# Patient Record
Sex: FEMALE | Race: WHITE | Employment: FULL TIME | URBAN - NONMETROPOLITAN AREA
[De-identification: names, ages, dates, MRNs, and addresses within clinical notes are randomized per-mention and may not be internally consistent; named-entity substitution may affect disease eponyms.]

---

## 2021-02-08 ENCOUNTER — HOSPITAL ENCOUNTER (INPATIENT)
Age: 36
LOS: 3 days | Discharge: HOME | End: 2021-02-11
Attending: OBSTETRICS & GYNECOLOGY | Admitting: OBSTETRICS & GYNECOLOGY
Payer: COMMERCIAL

## 2021-02-08 ENCOUNTER — ANESTHESIA (OUTPATIENT)
Dept: LABOR AND DELIVERY | Age: 36
End: 2021-02-08
Payer: COMMERCIAL

## 2021-02-08 ENCOUNTER — ANESTHESIA EVENT (OUTPATIENT)
Dept: LABOR AND DELIVERY | Age: 36
End: 2021-02-08
Payer: COMMERCIAL

## 2021-02-08 LAB
ABO: NORMAL
AMNISURE PATIENT RESULT: NORMAL
AMPHETAMINE+METHAMPHETAMINE URINE SCREEN: NEGATIVE
ANTIBODY IDENTIFICATION: NORMAL
ANTIBODY SCREEN: NORMAL
BARBITURATE QUANTITATIVE URINE: NEGATIVE
BASOPHILS # BLD: 0.3 %
BASOPHILS ABSOLUTE: 0 THOU/MM3 (ref 0–0.1)
BENZODIAZEPINE QUANTITATIVE URINE: NEGATIVE
CANNABINOID QUANTITATIVE URINE: NEGATIVE
COCAINE METABOLITE QUANTITATIVE URINE: NEGATIVE
EOSINOPHIL # BLD: 0.3 %
EOSINOPHILS ABSOLUTE: 0 THOU/MM3 (ref 0–0.4)
ERYTHROCYTE [DISTWIDTH] IN BLOOD BY AUTOMATED COUNT: 13.4 % (ref 11.5–14.5)
ERYTHROCYTE [DISTWIDTH] IN BLOOD BY AUTOMATED COUNT: 43 FL (ref 35–45)
HCT VFR BLD CALC: 39.1 % (ref 37–47)
HEMOGLOBIN: 13.1 GM/DL (ref 12–16)
IMMATURE GRANS (ABS): 0.08 THOU/MM3 (ref 0–0.07)
IMMATURE GRANULOCYTES: 0.5 %
INDIRECT COOMBS: NORMAL
LYMPHOCYTES # BLD: 19.9 %
LYMPHOCYTES ABSOLUTE: 3 THOU/MM3 (ref 1–4.8)
Lab: NORMAL
MCH RBC QN AUTO: 29.4 PG (ref 26–33)
MCHC RBC AUTO-ENTMCNC: 33.5 GM/DL (ref 32.2–35.5)
MCV RBC AUTO: 87.7 FL (ref 81–99)
MONOCYTES # BLD: 5.3 %
MONOCYTES ABSOLUTE: 0.8 THOU/MM3 (ref 0.4–1.3)
NUCLEATED RED BLOOD CELLS: 0 /100 WBC
OPIATES, URINE: NEGATIVE
OXYCODONE: NEGATIVE
PHENCYCLIDINE QUANTITATIVE URINE: NEGATIVE
PLATELET # BLD: 303 THOU/MM3 (ref 130–400)
PMV BLD AUTO: 10.8 FL (ref 9.4–12.4)
RBC # BLD: 4.46 MILL/MM3 (ref 4.2–5.4)
RH FACTOR: NORMAL
RPR: NONREACTIVE
SEG NEUTROPHILS: 73.7 %
SEGMENTED NEUTROPHILS ABSOLUTE COUNT: 11.1 THOU/MM3 (ref 1.8–7.7)
WBC # BLD: 15.1 THOU/MM3 (ref 4.8–10.8)

## 2021-02-08 PROCEDURE — 80307 DRUG TEST PRSMV CHEM ANLYZR: CPT

## 2021-02-08 PROCEDURE — 86870 RBC ANTIBODY IDENTIFICATION: CPT

## 2021-02-08 PROCEDURE — 36415 COLL VENOUS BLD VENIPUNCTURE: CPT

## 2021-02-08 PROCEDURE — 6360000002 HC RX W HCPCS: Performed by: ANESTHESIOLOGY

## 2021-02-08 PROCEDURE — 86592 SYPHILIS TEST NON-TREP QUAL: CPT

## 2021-02-08 PROCEDURE — 86901 BLOOD TYPING SEROLOGIC RH(D): CPT

## 2021-02-08 PROCEDURE — 3700000025 EPIDURAL BLOCK: Performed by: ANESTHESIOLOGY

## 2021-02-08 PROCEDURE — 2580000003 HC RX 258: Performed by: OBSTETRICS & GYNECOLOGY

## 2021-02-08 PROCEDURE — 86905 BLOOD TYPING RBC ANTIGENS: CPT

## 2021-02-08 PROCEDURE — 84112 EVAL AMNIOTIC FLUID PROTEIN: CPT

## 2021-02-08 PROCEDURE — 85025 COMPLETE CBC W/AUTO DIFF WBC: CPT

## 2021-02-08 PROCEDURE — 86885 COOMBS TEST INDIRECT QUAL: CPT

## 2021-02-08 PROCEDURE — 6360000002 HC RX W HCPCS

## 2021-02-08 PROCEDURE — 2500000003 HC RX 250 WO HCPCS: Performed by: ANESTHESIOLOGY

## 2021-02-08 PROCEDURE — 6360000002 HC RX W HCPCS: Performed by: OBSTETRICS & GYNECOLOGY

## 2021-02-08 PROCEDURE — 86900 BLOOD TYPING SEROLOGIC ABO: CPT

## 2021-02-08 PROCEDURE — 86850 RBC ANTIBODY SCREEN: CPT

## 2021-02-08 PROCEDURE — 1220000001 HC SEMI PRIVATE L&D R&B

## 2021-02-08 RX ORDER — ROPIVACAINE HYDROCHLORIDE 2 MG/ML
INJECTION, SOLUTION EPIDURAL; INFILTRATION; PERINEURAL PRN
Status: DISCONTINUED | OUTPATIENT
Start: 2021-02-08 | End: 2021-02-09 | Stop reason: SDUPTHER

## 2021-02-08 RX ORDER — ACETAMINOPHEN 325 MG/1
650 TABLET ORAL EVERY 4 HOURS PRN
Status: DISCONTINUED | OUTPATIENT
Start: 2021-02-08 | End: 2021-02-09 | Stop reason: HOSPADM

## 2021-02-08 RX ORDER — NALOXONE HYDROCHLORIDE 0.4 MG/ML
0.4 INJECTION, SOLUTION INTRAMUSCULAR; INTRAVENOUS; SUBCUTANEOUS PRN
Status: DISCONTINUED | OUTPATIENT
Start: 2021-02-08 | End: 2021-02-09 | Stop reason: HOSPADM

## 2021-02-08 RX ORDER — FENTANYL CITRATE 50 UG/ML
INJECTION, SOLUTION INTRAMUSCULAR; INTRAVENOUS PRN
Status: DISCONTINUED | OUTPATIENT
Start: 2021-02-08 | End: 2021-02-09 | Stop reason: SDUPTHER

## 2021-02-08 RX ORDER — SODIUM CHLORIDE 0.9 % (FLUSH) 0.9 %
10 SYRINGE (ML) INJECTION PRN
Status: DISCONTINUED | OUTPATIENT
Start: 2021-02-08 | End: 2021-02-09 | Stop reason: HOSPADM

## 2021-02-08 RX ORDER — SODIUM CHLORIDE, SODIUM LACTATE, POTASSIUM CHLORIDE, CALCIUM CHLORIDE 600; 310; 30; 20 MG/100ML; MG/100ML; MG/100ML; MG/100ML
INJECTION, SOLUTION INTRAVENOUS CONTINUOUS
Status: DISCONTINUED | OUTPATIENT
Start: 2021-02-08 | End: 2021-02-09

## 2021-02-08 RX ORDER — METHYLERGONOVINE MALEATE 0.2 MG/ML
200 INJECTION INTRAVENOUS PRN
Status: DISCONTINUED | OUTPATIENT
Start: 2021-02-08 | End: 2021-02-09 | Stop reason: HOSPADM

## 2021-02-08 RX ORDER — MISOPROSTOL 200 UG/1
1000 TABLET ORAL PRN
Status: DISCONTINUED | OUTPATIENT
Start: 2021-02-08 | End: 2021-02-09 | Stop reason: HOSPADM

## 2021-02-08 RX ORDER — SEVOFLURANE 250 ML/250ML
1 LIQUID RESPIRATORY (INHALATION) CONTINUOUS PRN
Status: DISCONTINUED | OUTPATIENT
Start: 2021-02-08 | End: 2021-02-09 | Stop reason: HOSPADM

## 2021-02-08 RX ORDER — TERBUTALINE SULFATE 1 MG/ML
0.25 INJECTION, SOLUTION SUBCUTANEOUS
Status: ACTIVE | OUTPATIENT
Start: 2021-02-08 | End: 2021-02-08

## 2021-02-08 RX ORDER — MORPHINE SULFATE 4 MG/ML
4 INJECTION, SOLUTION INTRAMUSCULAR; INTRAVENOUS
Status: DISCONTINUED | OUTPATIENT
Start: 2021-02-08 | End: 2021-02-09 | Stop reason: HOSPADM

## 2021-02-08 RX ORDER — ONDANSETRON 2 MG/ML
4 INJECTION INTRAMUSCULAR; INTRAVENOUS EVERY 6 HOURS PRN
Status: DISCONTINUED | OUTPATIENT
Start: 2021-02-08 | End: 2021-02-09 | Stop reason: HOSPADM

## 2021-02-08 RX ORDER — NALBUPHINE HCL 10 MG/ML
5 AMPUL (ML) INJECTION EVERY 4 HOURS PRN
Status: DISCONTINUED | OUTPATIENT
Start: 2021-02-08 | End: 2021-02-09 | Stop reason: HOSPADM

## 2021-02-08 RX ORDER — BUTORPHANOL TARTRATE 1 MG/ML
1 INJECTION, SOLUTION INTRAMUSCULAR; INTRAVENOUS
Status: DISCONTINUED | OUTPATIENT
Start: 2021-02-08 | End: 2021-02-09 | Stop reason: HOSPADM

## 2021-02-08 RX ORDER — DOCUSATE SODIUM 100 MG/1
100 CAPSULE, LIQUID FILLED ORAL DAILY
COMMUNITY
End: 2021-07-30

## 2021-02-08 RX ORDER — CARBOPROST TROMETHAMINE 250 UG/ML
250 INJECTION, SOLUTION INTRAMUSCULAR PRN
Status: CANCELLED | OUTPATIENT
Start: 2021-02-08

## 2021-02-08 RX ORDER — SODIUM CHLORIDE 0.9 % (FLUSH) 0.9 %
10 SYRINGE (ML) INJECTION EVERY 12 HOURS SCHEDULED
Status: DISCONTINUED | OUTPATIENT
Start: 2021-02-08 | End: 2021-02-09 | Stop reason: HOSPADM

## 2021-02-08 RX ORDER — ONDANSETRON 2 MG/ML
8 INJECTION INTRAMUSCULAR; INTRAVENOUS EVERY 6 HOURS PRN
Status: DISCONTINUED | OUTPATIENT
Start: 2021-02-08 | End: 2021-02-09 | Stop reason: HOSPADM

## 2021-02-08 RX ORDER — MORPHINE SULFATE 2 MG/ML
2 INJECTION, SOLUTION INTRAMUSCULAR; INTRAVENOUS
Status: DISCONTINUED | OUTPATIENT
Start: 2021-02-08 | End: 2021-02-09 | Stop reason: HOSPADM

## 2021-02-08 RX ORDER — IBUPROFEN 800 MG/1
800 TABLET ORAL EVERY 8 HOURS PRN
Status: DISCONTINUED | OUTPATIENT
Start: 2021-02-08 | End: 2021-02-09 | Stop reason: HOSPADM

## 2021-02-08 RX ORDER — DIPHENHYDRAMINE HYDROCHLORIDE 50 MG/ML
25 INJECTION INTRAMUSCULAR; INTRAVENOUS EVERY 4 HOURS PRN
Status: DISCONTINUED | OUTPATIENT
Start: 2021-02-08 | End: 2021-02-09 | Stop reason: HOSPADM

## 2021-02-08 RX ORDER — LIDOCAINE HYDROCHLORIDE 10 MG/ML
30 INJECTION, SOLUTION EPIDURAL; INFILTRATION; INTRACAUDAL; PERINEURAL PRN
Status: DISCONTINUED | OUTPATIENT
Start: 2021-02-08 | End: 2021-02-09 | Stop reason: HOSPADM

## 2021-02-08 RX ORDER — ROPIVACAINE HYDROCHLORIDE 2 MG/ML
INJECTION, SOLUTION EPIDURAL; INFILTRATION; PERINEURAL
Status: COMPLETED
Start: 2021-02-08 | End: 2021-02-08

## 2021-02-08 RX ADMIN — ONDANSETRON 8 MG: 2 INJECTION INTRAMUSCULAR; INTRAVENOUS at 14:59

## 2021-02-08 RX ADMIN — SODIUM CHLORIDE, POTASSIUM CHLORIDE, SODIUM LACTATE AND CALCIUM CHLORIDE: 600; 310; 30; 20 INJECTION, SOLUTION INTRAVENOUS at 01:57

## 2021-02-08 RX ADMIN — ONDANSETRON 4 MG: 2 INJECTION INTRAMUSCULAR; INTRAVENOUS at 18:18

## 2021-02-08 RX ADMIN — ONDANSETRON 8 MG: 2 INJECTION INTRAMUSCULAR; INTRAVENOUS at 21:57

## 2021-02-08 RX ADMIN — BUTORPHANOL TARTRATE 1 MG: 1 INJECTION, SOLUTION INTRAMUSCULAR; INTRAVENOUS at 07:35

## 2021-02-08 RX ADMIN — SODIUM CHLORIDE, POTASSIUM CHLORIDE, SODIUM LACTATE AND CALCIUM CHLORIDE: 600; 310; 30; 20 INJECTION, SOLUTION INTRAVENOUS at 09:53

## 2021-02-08 RX ADMIN — SODIUM CHLORIDE, POTASSIUM CHLORIDE, SODIUM LACTATE AND CALCIUM CHLORIDE: 600; 310; 30; 20 INJECTION, SOLUTION INTRAVENOUS at 11:30

## 2021-02-08 RX ADMIN — Medication 18 ML/HR: at 10:09

## 2021-02-08 RX ADMIN — DIPHENHYDRAMINE HYDROCHLORIDE 25 MG: 50 INJECTION, SOLUTION INTRAMUSCULAR; INTRAVENOUS at 22:47

## 2021-02-08 RX ADMIN — FENTANYL CITRATE 100 MCG: 50 INJECTION, SOLUTION INTRAMUSCULAR; INTRAVENOUS at 21:04

## 2021-02-08 RX ADMIN — ROPIVACAINE HYDROCHLORIDE 8 ML: 2 INJECTION, SOLUTION EPIDURAL; INFILTRATION at 21:04

## 2021-02-08 RX ADMIN — Medication 1 MILLI-UNITS/MIN: at 02:50

## 2021-02-08 RX ADMIN — SODIUM CHLORIDE, POTASSIUM CHLORIDE, SODIUM LACTATE AND CALCIUM CHLORIDE: 600; 310; 30; 20 INJECTION, SOLUTION INTRAVENOUS at 19:15

## 2021-02-08 ASSESSMENT — PAIN DESCRIPTION - DESCRIPTORS
DESCRIPTORS: CRAMPING;DISCOMFORT
DESCRIPTORS: CRAMPING
DESCRIPTORS: CRAMPING;DISCOMFORT
DESCRIPTORS: CRAMPING;DISCOMFORT

## 2021-02-08 NOTE — FLOWSHEET NOTE
Dr Tu Lynn notified of pt arrival and SROM at 36+4 weeks gestation, neg GBS, fht's with accels, irreg ctx. Orders received to start Pitocin and notify Dr Jennifer Man in the morning.

## 2021-02-08 NOTE — PLAN OF CARE
Problem: Anxiety:  Goal: Level of anxiety will decrease  Description: Level of anxiety will decrease  2021 by Jessie Worrell RN  Outcome: Met This Shift  2021 by Bayron Zaman RN  Note: Pt remains calm about the birthing experience,  at bedside, supportive. All questions/concerns addressed by RN. Problem: Breathing Pattern - Ineffective:  Goal: Able to breathe comfortably  Description: Able to breathe comfortably  2021 by Jessie Worrell RN  Outcome: Met This Shift  2021 by Bayron Zaman RN  Note: No signs of resp distress noted. Sp02 remains greater than 92% on room air. Respirations equal and unlabored. Problem: Fluid Volume - Imbalance:  Goal: Absence of intrapartum hemorrhage signs and symptoms  Description: Absence of intrapartum hemorrhage signs and symptoms  2021 by Jessie Worrell RN  Outcome: Met This Shift  2021 by Bayron Zaman RN  Note: No vaginal bleeding noted, will continue to monitor. Problem: Infection - Intrapartum Infection:  Goal: Will show no infection signs and symptoms  Description: Will show no infection signs and symptoms  2021 by Jessie Worrell RN  Outcome: Met This Shift  2021 by Bayron Zaman RN  Note: Vitals stable, pt remains afebrile. FHT's remain reassuring, will continue to monitor. Problem: Labor Process - Prolonged:  Goal: Uterine contractions within specified parameters  Description: Uterine contractions within specified parameters  2021 by Jessie Worrell RN  Outcome: Met This Shift  2021 by Bayron Zaman RN  Note: Pt will have Pitocin started . Having irreg.  ctx     Problem:  Screening:  Goal: Ability to make informed decisions regarding treatment has improved  Description: Ability to make informed decisions regarding treatment has improved  2021 by Jessie Worrell RN  Outcome: Met This Shift  2021 by Lauren Virk Finley Dakins Siefker, RN  Note: Alert and oriented x3     Problem: Pain - Acute:  Goal: Able to cope with pain  Description: Able to cope with pain  2/8/2021 0743 by Marcin Barahona RN  Outcome: Met This Shift  2/8/2021 0146 by Beatrice Sloan RN  Note: Pt planning on labor epidural     Problem: Tissue Perfusion - Uteroplacental, Altered:  Goal: Absence of abnormal fetal heart rate pattern  Description: Absence of abnormal fetal heart rate pattern  2/8/2021 0743 by Marcin Barahona RN  Outcome: Met This Shift  2/8/2021 0146 by Beatrice Sloan RN  Note: Fht's regular and strong with accels. Will continue to monitor     Problem: Urinary Retention:  Goal: Urinary elimination within specified parameters  Description: Urinary elimination within specified parameters  2/8/2021 0743 by Marcin Barahona RN  Outcome: Met This Shift  2/8/2021 0146 by Beatrice Sloan RN  Note: Pt voiding sufficiently; will continue to montior      Problem: Falls - Risk of:  Goal: Absence of physical injury  Description: Absence of physical injury  2/8/2021 0743 by Marcin Barahona RN  Outcome: Met This Shift  2/8/2021 0146 by Beatrice Sloan RN  Note: Pt to remain from injuries/fall with IV in place, walkway will remain free of clutter. Problem: Discharge Planning:  Goal: Discharged to appropriate level of care  Description: Discharged to appropriate level of care  2/8/2021 0743 by Marcin Barahona RN  Outcome: Met This Shift  2/8/2021 0146 by Beatrice Sloan RN  Note: Discharge education will continue to be discussed with pt and  working towards vaginal delivery and transfer to [facility] after 2 hour recovery period.       Problem: Pain:  Goal: Pain level will decrease  Description: Pain level will decrease  Outcome: Met This Shift  Goal: Control of acute pain  Description: Control of acute pain  Outcome: Met This Shift  Goal: Control of chronic pain  Description: Control of chronic pain  Outcome: Met This Shift   Care plan reviewed with patient and . Patient and  verbalize understanding of the plan of care and contribute to goal setting.

## 2021-02-08 NOTE — ANESTHESIA PRE PROCEDURE
Department of Anesthesiology  Preprocedure Note       Name:  Sury Bazan   Age:  37 y.o.  :  1985                                          MRN:  [id number]         Date:  2021      Surgeon: * No surgeons listed *    Procedure: * No procedures listed *    Medications prior to admission:   Prior to Admission medications    Medication Sig Start Date End Date Taking?  Authorizing Provider   Prenatal MV-Min-Fe Fum-FA-DHA (PRENATAL 1 PO) Take 1 tablet by mouth daily   Yes Historical Provider, MD   docusate sodium (COLACE) 100 MG capsule Take 100 mg by mouth daily   Yes Historical Provider, MD   acetaminophen-codeine (TYLENOL #3) 300-30 MG per tablet Take 1 tablet by mouth every 4 hours as needed for Pain    Historical Provider, MD   naproxen (NAPROSYN) 500 MG tablet Take 1 tablet by mouth 2 times daily (with meals) for 10 days 10/13/15 10/23/15  Jed Kilpatrick, APRN - CNP       Current medications:    Current Facility-Administered Medications   Medication Dose Route Frequency Provider Last Rate Last Admin    oxytocin (PITOCIN) 30 units in 500 mL infusion  1 marky-units/min Intravenous Continuous Afua Mehta MD 6 mL/hr at 21 0538 6 marky-units/min at 21 0538    terbutaline (BRETHINE) injection 0.25 mg  0.25 mg Subcutaneous Once PRN Afua Mehta MD        lactated ringers infusion   Intravenous Continuous Afua Mehta  mL/hr at 21 0953 New Bag at 21 0953    sodium chloride flush 0.9 % injection 10 mL  10 mL Intravenous 2 times per day Afua Mehta MD        sodium chloride flush 0.9 % injection 10 mL  10 mL Intravenous PRN Afua Mehta MD        lidocaine PF 1 % injection 30 mL  30 mL Other PRN Afua Mehta MD        butorphanol (STADOL) injection 1 mg  1 mg Intravenous Q1H PRN Afua Mehta MD   1 mg at 21 0735    nitrous oxide 50% inhalation 1 each  1 each Inhalation Continuous PRN MD Mckenzie Coleman ondansetron (ZOFRAN) injection 8 mg  8 mg Intravenous Q6H PRN Vinod Celis MD        diphenhydrAMINE (BENADRYL) injection 25 mg  25 mg Intravenous Q4H PRN Vinod Celis, MD        oxytocin (PITOCIN) 10 unit bolus from the bag  9.96 Units Intravenous PRN Vinod Celis MD        And    oxytocin (PITOCIN) 30 units in 500 mL infusion  50 marky-units/min Intravenous PRINES Celis, MD        methylergonovine (METHERGINE) injection 200 mcg  200 mcg Intramuscular PRN Vinod Celis, MD        miSOPROStol (CYTOTEC) tablet 1,000 mcg  1,000 mcg Rectal PRN Vinod Celis, MD        acetaminophen (TYLENOL) tablet 650 mg  650 mg Oral Q4H PRN Vinod Body, MD        ibuprofen (ADVIL;MOTRIN) tablet 800 mg  800 mg Oral Q8H PRN Vinod Celis, MD        morphine (PF) injection 2 mg  2 mg Intravenous Q2H PRN Vinod Celis MD        Or    morphine injection 4 mg  4 mg Intravenous Q2H PRN Vinod Celis, MD        witch hazel-glycerin (TUCKS) pad   Topical PRN Vinod Celis, MD        benzocaine-menthol (DERMOPLAST) 20-0.5 % spray   Topical PRN Vinod Celis MD           Allergies: Allergies   Allergen Reactions    Sulfa Antibiotics Rash       Problem List:  There is no problem list on file for this patient.       Past Medical History:        Diagnosis Date    Abnormal Pap smear of cervix     cervical polyp    Arthritis        Past Surgical History:        Procedure Laterality Date    KNEE ARTHROSCOPY      WISDOM TOOTH EXTRACTION         Social History:    Social History     Tobacco Use    Smoking status: Never Smoker    Smokeless tobacco: Never Used   Substance Use Topics    Alcohol use: Not Currently     Comment: social                                Counseling given: Not Answered      Vital Signs (Current):   Vitals:    02/08/21 0745 02/08/21 0802 02/08/21 0902 02/08/21 0931   BP: (!) 154/84  132/87 136/82   Pulse:   91 92   Resp:  18 18    Temp: 2/8/2021

## 2021-02-08 NOTE — FLOWSHEET NOTE
Dr Va Mc paged; returned page with phone call in to dept. Given current update on pt as follows: , 36 4/7 wk to dept last night at [phone] with grossly srom, clear. Vag exam, could not reach the cervix; amelia vazquez was here and scanned pt and baby is vertex. Has been on pitocin all day and in various positions with last position high fowlers. Vag exam around  was 6/80/-1 and is much better applied to the cervix and is thinner. Does have all internal monitors; did get an epidural that is working well. Orders rec'd to check cervix around  and give dr Va Mc a call.

## 2021-02-08 NOTE — FLOWSHEET NOTE
admitted for c/o SROM. States had a large gush at home. States has good fetal movement and denies any bleeding. Oriented to room and gown given to change into. Patient to bathroom to void, informed of maternal drug testing policy in place on all laboring patients. Verbal consent received, paper consent to be signed and urine to be sent.

## 2021-02-08 NOTE — FLOWSHEET NOTE
Dr Luisana Veras in dept and requested to scan with US of presenting part of pt's. SVE found presenting part to be very high.

## 2021-02-08 NOTE — H&P
[address] [address]  History and Physical Update    Pt Name: Martin Haque  MRN: [id number]  YOB: 1985  Date of evaluation: 2/8/2021    [] I have examined the patient and reviewed the H&P/Consult and there are no changes to the patient or plans. [x] I have examined the patient and reviewed the H&P/Consult and have noted the following changes:    PPROM AT 39 WKS. FHT'S CAT 1. WILL INDUCE LABOR. Discussion with the patient and/ or family for proposed care, treatment, services; benefits, risks, side effects; likelihood of achieving goals and potential problems that may occur during recuperation was had and all questions were answered. Discussion with the patient and/ or family of reasonable alternatives to the proposed care, treatment, services and the discussion of the risks, benefits, side effects related to the alternatives and the risk related to not receiving the proposed care treatment services was also had and all questions were answered. For scheduled inductions of labor, please refer to the scheduling sheet for any maternal and / or fetal indications for the induction. They are not being placed here to avoid possible discrepancies and duplications in the medical record. Thank you. This will be/was done the day of admission/surgery in the pre op holding area or in L and D as applicable to this patient.         Alessandro Kramer MD  Electronically signed 2/8/2021 at 2:39 AM

## 2021-02-08 NOTE — ANESTHESIA PROCEDURE NOTES
Epidural Block    Patient location during procedure: OB  Start time: 2/8/2021 10:04 AM  End time: 2/8/2021 10:08 AM  Reason for block: labor epidural  Staffing  Performed: resident/CRNA   Anesthesiologist: Pierce Marroquin DO  Resident/CRNA: KAYLYN Min CRNA  Preanesthetic Checklist  Completed: patient identified, IV checked, site marked, risks and benefits discussed, surgical consent, monitors and equipment checked, pre-op evaluation, timeout performed, anesthesia consent given, oxygen available and patient being monitored  Epidural  Patient position: sitting  Prep: ChloraPrep  Patient monitoring: continuous pulse ox  Approach: midline  Location: lumbar (1-5)  Injection technique: OWEN saline  Provider prep: sterile gloves and mask  Needle  Needle type: Tuohy   Needle gauge: 18 G  Needle length: 3.5 in  Needle insertion depth: 8 cm  Catheter type: end hole  Catheter size: 19 G  Catheter at skin depth: 3 cm  Test dose: negative  Assessment  Hemodynamics: stable  Attempts: 1

## 2021-02-08 NOTE — PLAN OF CARE
Problem: Anxiety:  Goal: Level of anxiety will decrease  Description: Level of anxiety will decrease  Note: Pt remains calm about the birthing experience,  at bedside, supportive. All questions/concerns addressed by RN. Problem: Breathing Pattern - Ineffective:  Goal: Able to breathe comfortably  Description: Able to breathe comfortably  Note: No signs of resp distress noted. Sp02 remains greater than 92% on room air. Respirations equal and unlabored. Problem: Fluid Volume - Imbalance:  Goal: Absence of intrapartum hemorrhage signs and symptoms  Description: Absence of intrapartum hemorrhage signs and symptoms  Note: No vaginal bleeding noted, will continue to monitor. Problem: Infection - Intrapartum Infection:  Goal: Will show no infection signs and symptoms  Description: Will show no infection signs and symptoms  Note: Vitals stable, pt remains afebrile. FHT's remain reassuring, will continue to monitor. Problem: Labor Process - Prolonged:  Goal: Uterine contractions within specified parameters  Description: Uterine contractions within specified parameters  Note: Pt will have Pitocin started . Having irreg. ctx     Problem:  Screening:  Goal: Ability to make informed decisions regarding treatment has improved  Description: Ability to make informed decisions regarding treatment has improved  Note: Alert and oriented x3     Problem: Pain - Acute:  Goal: Able to cope with pain  Description: Able to cope with pain  Note: Pt planning on labor epidural     Problem: Tissue Perfusion - Uteroplacental, Altered:  Goal: Absence of abnormal fetal heart rate pattern  Description: Absence of abnormal fetal heart rate pattern  Note: Fht's regular and strong with accels.  Will continue to monitor     Problem: Urinary Retention:  Goal: Urinary elimination within specified parameters  Description: Urinary elimination within specified parameters  Note: Pt voiding sufficiently; will continue to jamar      Problem: Falls - Risk of:  Goal: Absence of physical injury  Description: Absence of physical injury  Note: Pt to remain from injuries/fall with IV in place, walkway will remain free of clutter. Problem: Discharge Planning:  Goal: Discharged to appropriate level of care  Description: Discharged to appropriate level of care  Note: Discharge education will continue to be discussed with pt and  working towards vaginal delivery and transfer to [facility] after 2 hour recovery period. Care plan reviewed with patient and [place].  Patient and Royal verbalize understanding of the plan of care and contribute to goal setting.

## 2021-02-08 NOTE — FLOWSHEET NOTE
Dr Lloyd Nieves here in room. US done for presentation due to SVE presenting part very high. US found infant to be cephalic. Orders received to continue with POC.

## 2021-02-08 NOTE — FLOWSHEET NOTE
Dr Elmo Amaya notified of pt here with SROM, fht's with accels, ctx pattern, on Pitocin and ve, leaking large amt of clear fluid. No new orders received at this time.  Continue with POC and Pitociin

## 2021-02-09 PROCEDURE — 6370000000 HC RX 637 (ALT 250 FOR IP): Performed by: OBSTETRICS & GYNECOLOGY

## 2021-02-09 PROCEDURE — 2580000003 HC RX 258: Performed by: OBSTETRICS & GYNECOLOGY

## 2021-02-09 PROCEDURE — 0KQM0ZZ REPAIR PERINEUM MUSCLE, OPEN APPROACH: ICD-10-PCS | Performed by: OBSTETRICS & GYNECOLOGY

## 2021-02-09 PROCEDURE — 1200000000 HC SEMI PRIVATE

## 2021-02-09 PROCEDURE — 3E033VJ INTRODUCTION OF OTHER HORMONE INTO PERIPHERAL VEIN, PERCUTANEOUS APPROACH: ICD-10-PCS | Performed by: OBSTETRICS & GYNECOLOGY

## 2021-02-09 PROCEDURE — 7200000001 HC VAGINAL DELIVERY

## 2021-02-09 RX ORDER — ONDANSETRON 2 MG/ML
4 INJECTION INTRAMUSCULAR; INTRAVENOUS EVERY 6 HOURS PRN
Status: DISCONTINUED | OUTPATIENT
Start: 2021-02-09 | End: 2021-02-11 | Stop reason: HOSPADM

## 2021-02-09 RX ORDER — HYDROCODONE BITARTRATE AND ACETAMINOPHEN 5; 325 MG/1; MG/1
2 TABLET ORAL EVERY 4 HOURS PRN
Status: DISCONTINUED | OUTPATIENT
Start: 2021-02-09 | End: 2021-02-11 | Stop reason: HOSPADM

## 2021-02-09 RX ORDER — FERROUS SULFATE 325(65) MG
325 TABLET ORAL
Status: DISCONTINUED | OUTPATIENT
Start: 2021-02-09 | End: 2021-02-11 | Stop reason: HOSPADM

## 2021-02-09 RX ORDER — ONDANSETRON 8 MG/1
8 TABLET, ORALLY DISINTEGRATING ORAL EVERY 8 HOURS PRN
Status: DISCONTINUED | OUTPATIENT
Start: 2021-02-09 | End: 2021-02-11 | Stop reason: HOSPADM

## 2021-02-09 RX ORDER — MORPHINE SULFATE 2 MG/ML
2 INJECTION, SOLUTION INTRAMUSCULAR; INTRAVENOUS
Status: DISCONTINUED | OUTPATIENT
Start: 2021-02-09 | End: 2021-02-11 | Stop reason: HOSPADM

## 2021-02-09 RX ORDER — IBUPROFEN 800 MG/1
800 TABLET ORAL 3 TIMES DAILY
Status: DISCONTINUED | OUTPATIENT
Start: 2021-02-09 | End: 2021-02-11 | Stop reason: HOSPADM

## 2021-02-09 RX ORDER — SODIUM CHLORIDE 0.9 % (FLUSH) 0.9 %
10 SYRINGE (ML) INJECTION PRN
Status: DISCONTINUED | OUTPATIENT
Start: 2021-02-09 | End: 2021-02-11 | Stop reason: HOSPADM

## 2021-02-09 RX ORDER — ACETAMINOPHEN 325 MG/1
650 TABLET ORAL EVERY 4 HOURS PRN
Status: DISCONTINUED | OUTPATIENT
Start: 2021-02-09 | End: 2021-02-11 | Stop reason: HOSPADM

## 2021-02-09 RX ORDER — MISOPROSTOL 200 UG/1
800 TABLET ORAL
Status: ACTIVE | OUTPATIENT
Start: 2021-02-09 | End: 2021-02-09

## 2021-02-09 RX ORDER — MORPHINE SULFATE 4 MG/ML
4 INJECTION, SOLUTION INTRAMUSCULAR; INTRAVENOUS
Status: DISCONTINUED | OUTPATIENT
Start: 2021-02-09 | End: 2021-02-11 | Stop reason: HOSPADM

## 2021-02-09 RX ORDER — METHYLERGONOVINE MALEATE 0.2 MG/ML
200 INJECTION INTRAVENOUS
Status: ACTIVE | OUTPATIENT
Start: 2021-02-09 | End: 2021-02-09

## 2021-02-09 RX ORDER — SODIUM CHLORIDE, SODIUM LACTATE, POTASSIUM CHLORIDE, CALCIUM CHLORIDE 600; 310; 30; 20 MG/100ML; MG/100ML; MG/100ML; MG/100ML
INJECTION, SOLUTION INTRAVENOUS CONTINUOUS
Status: DISCONTINUED | OUTPATIENT
Start: 2021-02-09 | End: 2021-02-11 | Stop reason: HOSPADM

## 2021-02-09 RX ORDER — SODIUM CHLORIDE 0.9 % (FLUSH) 0.9 %
10 SYRINGE (ML) INJECTION EVERY 12 HOURS SCHEDULED
Status: DISCONTINUED | OUTPATIENT
Start: 2021-02-09 | End: 2021-02-11 | Stop reason: HOSPADM

## 2021-02-09 RX ORDER — MODIFIED LANOLIN
OINTMENT (GRAM) TOPICAL PRN
Status: DISCONTINUED | OUTPATIENT
Start: 2021-02-09 | End: 2021-02-11 | Stop reason: HOSPADM

## 2021-02-09 RX ORDER — DOCUSATE SODIUM 100 MG/1
100 CAPSULE, LIQUID FILLED ORAL 2 TIMES DAILY PRN
Status: DISCONTINUED | OUTPATIENT
Start: 2021-02-09 | End: 2021-02-11 | Stop reason: HOSPADM

## 2021-02-09 RX ORDER — HYDROCODONE BITARTRATE AND ACETAMINOPHEN 5; 325 MG/1; MG/1
1 TABLET ORAL EVERY 4 HOURS PRN
Status: DISCONTINUED | OUTPATIENT
Start: 2021-02-09 | End: 2021-02-11 | Stop reason: HOSPADM

## 2021-02-09 RX ORDER — CARBOPROST TROMETHAMINE 250 UG/ML
250 INJECTION, SOLUTION INTRAMUSCULAR
Status: ACTIVE | OUTPATIENT
Start: 2021-02-09 | End: 2021-02-09

## 2021-02-09 RX ADMIN — IBUPROFEN 800 MG: 800 TABLET, FILM COATED ORAL at 05:56

## 2021-02-09 RX ADMIN — DOCUSATE SODIUM 100 MG: 100 CAPSULE, LIQUID FILLED ORAL at 21:37

## 2021-02-09 RX ADMIN — ACETAMINOPHEN 650 MG: 325 TABLET ORAL at 17:45

## 2021-02-09 RX ADMIN — ACETAMINOPHEN 650 MG: 325 TABLET ORAL at 21:37

## 2021-02-09 RX ADMIN — ACETAMINOPHEN 650 MG: 325 TABLET ORAL at 13:15

## 2021-02-09 RX ADMIN — IBUPROFEN 800 MG: 800 TABLET, FILM COATED ORAL at 14:03

## 2021-02-09 RX ADMIN — SODIUM CHLORIDE, POTASSIUM CHLORIDE, SODIUM LACTATE AND CALCIUM CHLORIDE: 600; 310; 30; 20 INJECTION, SOLUTION INTRAVENOUS at 02:30

## 2021-02-09 ASSESSMENT — PAIN SCALES - GENERAL
PAINLEVEL_OUTOF10: 5
PAINLEVEL_OUTOF10: 4
PAINLEVEL_OUTOF10: 4
PAINLEVEL_OUTOF10: 8

## 2021-02-09 NOTE — FLOWSHEET NOTE
Patient denies need to void. Clean gown provided. Trinity care given. New underwear, pad, tucks, dermoplast and an ice pack provided.

## 2021-02-09 NOTE — FLOWSHEET NOTE
Dr. Nestor Dodd updated that patient had 3 late decelerations. SVE was done after and the patient is now a lip and 0 station. Patient was repositioned to semi folwers and the late decelerations resolved. RN will continue with poc and update as needed.

## 2021-02-09 NOTE — PLAN OF CARE
Problem: Discharge Planning:  Goal: Discharged to appropriate level of care  Description: Discharged to appropriate level of care  2/9/2021 0809 by Glenda Patten RN  Outcome: Ongoing  Note: Plan is to be discharged home with . Problem: Constipation:  Goal: Bowel elimination is within specified parameters  Description: Bowel elimination is within specified parameters  2/9/2021 0809 by Glenda Patten RN  Outcome: Ongoing  Note: Patient not passing gas. Encouraged Po fluids an ambulation. Problem: Fluid Volume - Imbalance:  Goal: Absence of postpartum hemorrhage signs and symptoms  Description: Absence of postpartum hemorrhage signs and symptoms  2/9/2021 0809 by Glenda Patten RN  Outcome: Ongoing  Note: Vaginal bleeding WNL, no clots or foul odors. Problem: Infection - Risk of, Puerperal Infection:  Goal: Will show no infection signs and symptoms  Description: Will show no infection signs and symptoms  2/9/2021 0809 by Glenda Patten RN  Outcome: Ongoing  Note: Afebrile this shift. Problem: Mood - Altered:  Goal: Mood stable  Description: Mood stable  2/9/2021 0809 by Glenda Patten RN  Outcome: Ongoing  Note: Emotional support provided. Problem: Pain - Acute:  Goal: Pain level will decrease  Description: Pain level will decrease  2/9/2021 0809 by Glenda Patten RN  Outcome: Ongoing  Note: Scheduled motrin given with relief of abdominal cramping. Encourage repositioning for pins and needles feeling in feet due to swelling. Care plan reviewed with patient and she contributes to goal setting and voices understanding of plan of care.

## 2021-02-09 NOTE — L&D DELIVERY NOTE
Department of Obstetrics and Gynecology   Vaginal Delivery Note at [facility]      Date of Delivery:  2021    Procedure:  Spontaneous vaginal delivery and Repair second degree spontaneous laceration    Surgeon:  Ritesh Fernando    Anesthesia:  epidural anesthesia    Estimated blood loss:  400ml    Cord blood sent Yes    Complications:   rupture of membranes    Condition:  infant stable to general nursery and mother stable    Details of Procedure: The patient is a 37 y.o.  female at 44w9d  who was admitted for  rupture of membranes. She received the following interventions: IV Pitocin induction. The patient progressed well,did receive an epidural, became complete and started to push. Patient progressed well and the fetal head was delivered over an intact perineum, and the rest of the infant delivered atraumatically, placed on mother abdomen. The cord was clamped and cut after 1 minute and the crying infant placed skin to skin with the waiting nurse for evaluation. The delivery of the placenta was spontaneous. The perineum and vagina were explored and a second degree laceration was repaired in standard fashion with 3-0 vicryl. A vaginal sweep was completed and three sharps were placed in the proper container. Sponge count correct.     Infant Wt: 6#12    APGARSManny [place] Boy Steve Campbell [[id number]]    Apgars    Living status: Living  Apgars   1 Minute:  5 Minute:  10 Minute 15 Minute 20 Minute   Skin Color: 0  1       Heart Rate: 2  2       Reflex Irritability: 2  2       Muscle Tone: 2  2       Respiratory Effort: 2  2       Total: 8  9               Apgars Assigned By: Mukul Lira              Electronically signed by Christianne Brunner, MD on 2021 at 2:01 AM

## 2021-02-09 NOTE — PROGRESS NOTES
Andrzej Contreras steady with 2 assist to bathroom due to patient feeling pins and needles in legs and ankle pain. Voided moderate amount, moderate amount of rubra lochia, no clots. Trinity care provided and ambulated Did not check fundus due to patient from [address] [place] [address] steady to wheelchair to [facility].

## 2021-02-09 NOTE — PLAN OF CARE
Problem: Anxiety:  Goal: Level of anxiety will decrease  Description: Level of anxiety will decrease  2021 by Nilesh Johnson RN  Outcome: Ongoing  2021 0743 by Jessie Worrell RN  Outcome: Met This Shift     Problem: Breathing Pattern - Ineffective:  Goal: Able to breathe comfortably  Description: Able to breathe comfortably  2021 by Nilesh Johnson RN  Outcome: Ongoing  2021 0743 by Jessie Worrell RN  Outcome: Met This Shift     Problem: Fluid Volume - Imbalance:  Goal: Absence of intrapartum hemorrhage signs and symptoms  Description: Absence of intrapartum hemorrhage signs and symptoms  2021 by Nilesh Johnson RN  Outcome: Ongoing  2021 by Jessie Worrell RN  Outcome: Met This Shift     Problem: Infection - Intrapartum Infection:  Goal: Will show no infection signs and symptoms  Description: Will show no infection signs and symptoms  2021 by Nilesh Johnson RN  Outcome: Ongoing  2021 0743 by Jessie Worrell RN  Outcome: Met This Shift     Problem: Labor Process - Prolonged:  Goal: Uterine contractions within specified parameters  Description: Uterine contractions within specified parameters  2021 by Nilesh Johnson RN  Outcome: Ongoing  [id number] by Jessie Worrell RN  Outcome: Met This Shift     Problem:  Screening:  Goal: Ability to make informed decisions regarding treatment has improved  Description: Ability to make informed decisions regarding treatment has improved  2021 by Nilesh Johnson RN  Outcome: Ongoing  2021 0743 by Jessie Worrell RN  Outcome: Met This Shift     Problem: Pain - Acute:  Goal: Able to cope with pain  Description: Able to cope with pain  2021 by Nilesh Johnson RN  Outcome: Ongoing  2021 0743 by Jessie Worrell RN  Outcome: Met This Shift     Problem: Tissue Perfusion - Uteroplacental, Altered:  Goal: Absence of abnormal fetal heart rate pattern  Description:

## 2021-02-09 NOTE — ANESTHESIA POSTPROCEDURE EVALUATION
Department of Anesthesiology  Postprocedure Note    Patient: Rashmi Andrade  MRN: [id number]  YOB: 1985  Date of evaluation: 2/9/2021  Time:  7:32 AM     Procedure Summary     Date: 02/08/21 Room / Location:     Anesthesia Start: 1004 Anesthesia Stop: 02/09/21 0125    Procedure: Labor Analgesia Diagnosis:     Scheduled Providers:  Responsible Provider: Annette Nole DO    Anesthesia Type: epidural ASA Status: 2          Anesthesia Type: epidural    Cal Phase I: Cal Score: 9    Cal Phase II: Cal Score: 9    Last vitals: Reviewed and per EMR flowsheets.        Anesthesia Post Evaluation    Patient location during evaluation: floor  Patient participation: complete - patient participated  Level of consciousness: awake  Airway patency: patent  Nausea & Vomiting: no vomiting and no nausea  Complications: no  Cardiovascular status: hemodynamically stable  Respiratory status: acceptable  Hydration status: stable

## 2021-02-09 NOTE — FLOWSHEET NOTE
Patient transferred to mom/baby via wheelchair in stable condition. Respirations easy and unlabored.

## 2021-02-09 NOTE — PROGRESS NOTES
Called for delivery. On arrival at bedside pt complete and +4-+5 with pushing. FHTs: 135, mod betsy, no accels, variables toco: q2-3 min. Category II tracing. Overall reassuring. Anticipate .      Marianna Joel  2:01 AM  2021

## 2021-02-10 LAB
HCT VFR BLD CALC: 29.7 % (ref 37–47)
HEMOGLOBIN: 9.8 GM/DL (ref 12–16)

## 2021-02-10 PROCEDURE — 85018 HEMOGLOBIN: CPT

## 2021-02-10 PROCEDURE — 36415 COLL VENOUS BLD VENIPUNCTURE: CPT

## 2021-02-10 PROCEDURE — 85014 HEMATOCRIT: CPT

## 2021-02-10 PROCEDURE — 6370000000 HC RX 637 (ALT 250 FOR IP): Performed by: OBSTETRICS & GYNECOLOGY

## 2021-02-10 PROCEDURE — 1200000000 HC SEMI PRIVATE

## 2021-02-10 RX ORDER — IBUPROFEN 200 MG
800 TABLET ORAL EVERY 8 HOURS PRN
COMMUNITY
Start: 2021-02-10 | End: 2021-11-18

## 2021-02-10 RX ADMIN — DOCUSATE SODIUM 100 MG: 100 CAPSULE, LIQUID FILLED ORAL at 20:06

## 2021-02-10 RX ADMIN — IBUPROFEN 800 MG: 800 TABLET, FILM COATED ORAL at 13:48

## 2021-02-10 RX ADMIN — DOCUSATE SODIUM 100 MG: 100 CAPSULE, LIQUID FILLED ORAL at 08:31

## 2021-02-10 RX ADMIN — IBUPROFEN 800 MG: 800 TABLET, FILM COATED ORAL at 03:57

## 2021-02-10 ASSESSMENT — PAIN SCALES - GENERAL
PAINLEVEL_OUTOF10: 3
PAINLEVEL_OUTOF10: 0
PAINLEVEL_OUTOF10: 3
PAINLEVEL_OUTOF10: 0

## 2021-02-10 ASSESSMENT — PAIN - FUNCTIONAL ASSESSMENT: PAIN_FUNCTIONAL_ASSESSMENT: ACTIVITIES ARE NOT PREVENTED

## 2021-02-10 NOTE — DISCHARGE SUMMARY
Vaginal Delivery Discharge Summary    Gestational Age:36w5d    Antepartum complications: none    Date of Delivery: 2/10/2021     Condition: Good     Type of Delivery: Vaginal     [place] Data  Information for the patient's :  Ashley Waterman [[id number]]   male   Birth Weight: 6 lb 11.9 oz (3.06 kg)       Labs: CBC   Lab Results   Component Value Date    HGB 9.8 (L) 02/10/2021    HCT 29.7 (L) 02/10/2021        Intrapartum complications: None    Postpartum complications: none    The patient is ambulating well. The patient is tolerating a normal diet.       Discharge Date: 2/10/2021    Plan:   Follow up in the office in 6 weeks    [address] [place] [address]

## 2021-02-10 NOTE — PLAN OF CARE
Problem: Discharge Planning:  Goal: Discharged to appropriate level of care  Description: Discharged to appropriate level of care  2/10/2021 1315 by Roly Acevedo RN  Outcome: Ongoing  Note: Discharge planning continues     Problem: Constipation:  Goal: Bowel elimination is within specified parameters  Description: Bowel elimination is within specified parameters  2/10/2021 1315 by Roly Acevedo RN  Outcome: Ongoing  Note: NO BM , passing gas, colace given     Problem: Fluid Volume - Imbalance:  Goal: Absence of postpartum hemorrhage signs and symptoms  Description: Absence of postpartum hemorrhage signs and symptoms  2/10/2021 1315 by Roly Acevedo RN  Outcome: Ongoing  Note: Small amount of lochia, no clots reported     Problem: Infection - Risk of, Puerperal Infection:  Goal: Will show no infection signs and symptoms  Description: Will show no infection signs and symptoms  2/10/2021 1315 by Roly Acevedo RN  Outcome: Ongoing  Note: Vitals stable     Problem: Mood - Altered:  Goal: Mood stable  Description: Mood stable  2/10/2021 1315 by Roly Acevedo RN  Outcome: Ongoing  Note: Stable mood expresses needs     Problem: Pain - Acute:  Goal: Pain level will decrease  Description: Pain level will decrease  2/10/2021 1315 by Roly Acevedo RN  Outcome: Ongoing  Note: Pain controlled well with Motrin, Tylenol, rest, repositioning, pain goal 4   Care plan reviewed with patient and SO. Patient and SO verbalize understanding of the plan of care and contribute to goal setting.

## 2021-02-10 NOTE — LACTATION NOTE
To room to assist with latch. Infant latching with shield. Dripping formula on shield to stimulate suck. Discussed pumping after shield use and giving infant and EBM. Pt states no other questions or concerns. RN notified. Will follow up PRN.

## 2021-02-10 NOTE — LACTATION NOTE
Called to room to assist with latch. Infant just back from circumcision. Infant sleepy no latch achieved. Demonstrated hand expression. No colostrum collected at this time. Pt has a pump set up in room. Encouraged Pt to pump if infant is not latching at this time. Pt is supplementing with formula as well. Encouraged frequent STS after circumcision and for sleepy infant. RN notified of Pt plan. Will follow up PRN.

## 2021-02-10 NOTE — PROGRESS NOTES
Department of Obstetrics and Gynecology  Labor and Delivery  Attending Post Partum Progress Note    SUBJECTIVE:  PPD# 1    OBJECTIVE: Doing well     Vitals:  /68   Pulse 94   Temp 97.6 °F (36.4 °C) (Oral)   Resp 16   Ht 5' 5\" (1.651 m)   Wt 284 lb (128.8 kg)   SpO2 99%   Breastfeeding Unknown   BMI 47.26 kg/m²     ABDOMEN:  Soft, NT, ND, fundus firm      DATA:    Hemoglobin:   Lab Results   Component Value Date    HGB 9.8 02/10/2021    HCT 29.7 02/10/2021       ASSESSMENT & PLAN:    PPD#1  - Baby doing well in [facility]  - Plan D/C home tomorrow    [address] [place] [address]

## 2021-02-11 VITALS
SYSTOLIC BLOOD PRESSURE: 124 MMHG | WEIGHT: 284 LBS | TEMPERATURE: 98.6 F | HEIGHT: 65 IN | BODY MASS INDEX: 47.32 KG/M2 | HEART RATE: 94 BPM | DIASTOLIC BLOOD PRESSURE: 78 MMHG | OXYGEN SATURATION: 99 % | RESPIRATION RATE: 16 BRPM

## 2021-02-11 PROCEDURE — 6370000000 HC RX 637 (ALT 250 FOR IP): Performed by: OBSTETRICS & GYNECOLOGY

## 2021-02-11 RX ADMIN — IBUPROFEN 800 MG: 800 TABLET, FILM COATED ORAL at 13:29

## 2021-02-11 RX ADMIN — ACETAMINOPHEN 650 MG: 325 TABLET ORAL at 08:26

## 2021-02-11 RX ADMIN — IBUPROFEN 800 MG: 800 TABLET, FILM COATED ORAL at 01:07

## 2021-02-11 RX ADMIN — DOCUSATE SODIUM 100 MG: 100 CAPSULE, LIQUID FILLED ORAL at 08:26

## 2021-02-11 ASSESSMENT — PAIN SCALES - GENERAL
PAINLEVEL_OUTOF10: 3
PAINLEVEL_OUTOF10: 3
PAINLEVEL_OUTOF10: 2

## 2021-02-11 NOTE — FLOWSHEET NOTE
Infant has roomed in with mother this shift except for infant's car seat study. Benefits of rooming in discussed.

## 2021-02-11 NOTE — FLOWSHEET NOTE
Post birth warning signs education paper given and reviewed, teaching complete. [place] postpartum depression screening discussed with patient, instructed to contact her healthcare provider if her score is > 10. Patient voiced understanding. Mother's blood type is A-.  Baby's blood type is A-.   Mother did not receive Rhogam.

## 2021-02-11 NOTE — PLAN OF CARE
Problem: Discharge Planning:  Goal: Discharged to appropriate level of care  Description: Discharged to appropriate level of care  Outcome: Ongoing  Note: Discharge to home today     Problem: Constipation:  Goal: Bowel elimination is within specified parameters  Description: Bowel elimination is within specified parameters  Outcome: Ongoing  Note: No BM passing gas, colace given     Problem: Fluid Volume - Imbalance:  Goal: Absence of postpartum hemorrhage signs and symptoms  Description: Absence of postpartum hemorrhage signs and symptoms  Outcome: Ongoing  Note: Small amount of lochia, no clots reported     Problem: Infection - Risk of, Puerperal Infection:  Goal: Will show no infection signs and symptoms  Description: Will show no infection signs and symptoms  Outcome: Ongoing  Note: Vitals stable     Problem: Mood - Altered:  Goal: Mood stable  Description: Mood stable  Outcome: Ongoing  Note: Stable mood expresses needs     Problem: Pain - Acute:  Goal: Pain level will decrease  Description: Pain level will decrease  Outcome: Ongoing  Note: Pain controlled well with Motrin, Tylenol, rest, repositioning, pain goal 3   Care plan reviewed with patient and SO. Patient and SO verbalize understanding of the plan of care and contribute to goal setting.

## 2021-02-11 NOTE — PLAN OF CARE
Problem: Discharge Planning:  Goal: Discharged to appropriate level of care  Description: Discharged to appropriate level of care  3/21/2842 3142 by Randi Walton RN  Outcome: Ongoing  Note: Working towards discharge home with family; needs addressed with mother; ducks in a row discussed        Problem: Constipation:  Goal: Bowel elimination is within specified parameters  Description: Bowel elimination is within specified parameters  7/35/8883 7833 by Randi Walton RN  Outcome: Ongoing  Note: Increasing fluids and ambulation. Patient taking colace. Problem: Fluid Volume - Imbalance:  Goal: Absence of postpartum hemorrhage signs and symptoms  Description: Absence of postpartum hemorrhage signs and symptoms  1/14/1379 1470 by Randi Walton RN  Outcome: Ongoing  Note: Small amount of lochia rubra noted. Vitals stable. Problem: Infection - Risk of, Puerperal Infection:  Goal: Will show no infection signs and symptoms  Description: Will show no infection signs and symptoms  3/46/3800 0548 by Randi Walton RN  Outcome: Ongoing  Note: Vital WNL; no s/sx of infection noted       Problem: Mood - Altered:  Goal: Mood stable  Description: Mood stable  8/90/6111 8048 by Randi Walton RN  Outcome: Ongoing  Note: Calm and cooperative; bonding well with infant       Problem: Pain - Acute:  Goal: Pain level will decrease  Description: Pain level will decrease  6/67/3009 9983 by Randi Walton RN  Outcome: Ongoing  Note: Managing pain with rest and motrin; Maintaining pain within pain goal of 4/10 with interventions    Plan of care discussed with mother and she contributes to goal setting and voices understanding of plan of care.

## 2021-02-11 NOTE — PROGRESS NOTES
Department of Obstetrics and Gynecology  Labor and Delivery  Attending Post Partum Progress Note    SUBJECTIVE:  PPD# 2    OBJECTIVE: Doing well     Vitals:  /84   Pulse 97   Temp 98.2 °F (36.8 °C) (Oral)   Resp 18   Ht 5' 5\" (1.651 m)   Wt 284 lb (128.8 kg)   SpO2 99%   Breastfeeding Unknown   BMI 47.26 kg/m²     ABDOMEN:  Soft, NT, ND, fundus firm      DATA:    Hemoglobin:   Lab Results   Component Value Date    HGB 9.8 02/10/2021    HCT 29.7 02/10/2021       ASSESSMENT & PLAN:    PPD#2  - Plan D/C home later today    [address] [place] [address]

## 2021-02-11 NOTE — LACTATION NOTE
Met with pt per her request to assist with latch. Baby very sleepy. We tried football along with hand expressing colostrum to wake him up without success. Tried on two occasions and same, baby too sleepy. Pt decided to go ahead and pump to give colostrum via syringe. Discussed hands on pumping and gave video resource for pt to watch. Pt knows about support available and will call accordingly. Baby is going to [facility] for jaundice. Pt knows we can visit her there too. Reported to RN.

## 2021-02-11 NOTE — LACTATION NOTE
Met with pt in room. Reviewed hand expression, explained how baby is able to release milk from breast in much the same way. Pt has nipples which are low on breast, tend to rest on her belly. Assisted her to use rolled up clothe to raise breast some to allow space for baby to latch. Offered to return for latch assistance. Discussed massage of breast, keeping suction on pump low due to edema present in breasts. Explained how to cut a bra to hold flanges. Pt knows about support available and will call prn for assistance.

## 2021-07-30 ENCOUNTER — HOSPITAL ENCOUNTER (EMERGENCY)
Age: 36
Discharge: HOME | End: 2021-07-30
Payer: COMMERCIAL

## 2021-07-30 VITALS
DIASTOLIC BLOOD PRESSURE: 97 MMHG | HEIGHT: 64 IN | RESPIRATION RATE: 16 BRPM | BODY MASS INDEX: 46.1 KG/M2 | OXYGEN SATURATION: 98 % | HEART RATE: 98 BPM | SYSTOLIC BLOOD PRESSURE: 147 MMHG | WEIGHT: 270 LBS

## 2021-07-30 DIAGNOSIS — J06.9 UPPER RESPIRATORY TRACT INFECTION, UNSPECIFIED TYPE: ICD-10-CM

## 2021-07-30 DIAGNOSIS — L50.9 URTICARIA: Primary | ICD-10-CM

## 2021-07-30 PROCEDURE — 96372 THER/PROPH/DIAG INJ SC/IM: CPT

## 2021-07-30 PROCEDURE — 99213 OFFICE O/P EST LOW 20 MIN: CPT

## 2021-07-30 PROCEDURE — 6360000002 HC RX W HCPCS: Performed by: NURSE PRACTITIONER

## 2021-07-30 PROCEDURE — 99203 OFFICE O/P NEW LOW 30 MIN: CPT | Performed by: NURSE PRACTITIONER

## 2021-07-30 RX ORDER — FAMOTIDINE 20 MG/1
20 TABLET, FILM COATED ORAL 2 TIMES DAILY
Qty: 60 TABLET | Refills: 3 | Status: SHIPPED | OUTPATIENT
Start: 2021-07-30 | End: 2021-11-18

## 2021-07-30 RX ORDER — METHYLPREDNISOLONE SODIUM SUCCINATE 125 MG/2ML
125 INJECTION, POWDER, LYOPHILIZED, FOR SOLUTION INTRAMUSCULAR; INTRAVENOUS ONCE
Status: COMPLETED | OUTPATIENT
Start: 2021-07-30 | End: 2021-07-30

## 2021-07-30 RX ORDER — PREDNISONE 20 MG/1
40 TABLET ORAL DAILY
Qty: 14 TABLET | Refills: 0 | Status: SHIPPED | OUTPATIENT
Start: 2021-07-30 | End: 2021-08-06

## 2021-07-30 RX ADMIN — METHYLPREDNISOLONE SODIUM SUCCINATE 125 MG: 125 INJECTION, POWDER, FOR SOLUTION INTRAMUSCULAR; INTRAVENOUS at 10:19

## 2021-07-30 ASSESSMENT — ENCOUNTER SYMPTOMS
VOMITING: 0
RHINORRHEA: 1
SORE THROAT: 1
DIARRHEA: 0
COUGH: 1
SHORTNESS OF BREATH: 0
NAUSEA: 0

## 2021-07-30 NOTE — ED NOTES
To [facility] with complaints of fever up to 101.9 yesterday. Body aches (yesterday), headache (yesterday), nasal congestion, sore throat. Started Tuesday. Has had hives for a few weeks, thought it was due to a new birth control. Stopped taking it but continues to have hives. States they are worse now.       Ted Parsons RN  07/30/21 [address] Erna Sy RN  07/30/21 0910

## 2021-07-30 NOTE — ED PROVIDER NOTES
[facility]  Urgent Care Encounter       CHIEF COMPLAINT       Chief Complaint   Patient presents with    Fever     101.9    Urticaria     for a couple of weeks,     Headache     yesterday    Generalized Body Aches     yesterday    Pharyngitis    Nasal Congestion       Nurses Notes reviewed and I agree except as noted in the HPI. HISTORY OF PRESENT ILLNESS   Luc Fragoso is a 31 y.o. female who presents for evaluation of an urticarial rash that has been ongoing for the past 10 days. Patient denies any new detergents, soaps, or any other types of exposures. States that the rash seems to come and go. She has been using topical Benadryl cream at home which does provide some relief. She denies any chest tightness, throat swelling or shortness of breath. She states that over the past 2 to 3 days she is also developed sinus pressure, congestion, mild cough and mild sore throat. States that she had a fever as high as 101 at home. She denies any loss of smell or taste. She states that she had Covid 5 months ago and denies any other known sick exposures. Has been using over-the-counter Mucinex cold and flu for this which does provide some relief. The history is provided by the patient. REVIEW OF SYSTEMS     Review of Systems   Constitutional: Positive for chills and fever. HENT: Positive for congestion, rhinorrhea and sore throat. Respiratory: Positive for cough. Negative for shortness of breath. Cardiovascular: Negative for chest pain. Gastrointestinal: Negative for diarrhea, nausea and vomiting. Musculoskeletal: Positive for myalgias. Negative for arthralgias. Skin: Positive for rash. Allergic/Immunologic: Negative for environmental allergies. Neurological: Positive for headaches.        PAST MEDICAL HISTORY         Diagnosis Date    Abnormal Pap smear of cervix     cervical polyp    Arthritis        SURGICALHISTORY     Patient  has a past surgical history that includes Knee arthroscopy and [place] tooth extraction. CURRENT MEDICATIONS       Previous Medications    IBUPROFEN (ADVIL;MOTRIN) 200 MG TABLET    Take 4 tablets by mouth every 8 hours as needed for Pain       ALLERGIES     Patient is is allergic to sulfa antibiotics. Patients   Immunization History   Administered Date(s) Administered    Tdap (Boostrix, Adacel) 12/01/2020       FAMILY HISTORY     Patient's family history includes Cancer in her father and mother; Diabetes in her brother and mother; High Blood Pressure in her mother; Stroke in her father. SOCIAL HISTORY     Patient  reports that she has never smoked. She has never used smokeless tobacco. She reports previous alcohol use. She reports that she does not use drugs. PHYSICAL EXAM     ED TRIAGE VITALS  BP: (!) 142/90,  , Pulse: 107, Resp: 16, SpO2: 98 %,Estimated body mass index is 46.35 kg/m² as calculated from the following:    Height as of this encounter: 5' 4\" (1.626 m). Weight as of this encounter: 270 lb (122.5 kg). ,Patient's last menstrual period was 07/04/2021. Physical Exam  Vitals and nursing note reviewed. Constitutional:       General: She is not in acute distress. Appearance: She is well-developed. She is not diaphoretic. HENT:      Right Ear: A middle ear effusion is present. Left Ear: A middle ear effusion is present. Mouth/Throat:      Mouth: Mucous membranes are moist.      Pharynx: Oropharynx is clear. Eyes:      Conjunctiva/sclera:      Right eye: Right conjunctiva is not injected. Left eye: Left conjunctiva is not injected. Pupils: Pupils are equal.   Cardiovascular:      Rate and Rhythm: Normal rate and regular rhythm. Heart sounds: No murmur heard. Pulmonary:      Effort: Pulmonary effort is normal. No respiratory distress. Breath sounds: Normal breath sounds. Musculoskeletal:      Cervical back: Normal range of motion.       Right knee: Normal range of motion. Left knee: Normal range of motion. Lymphadenopathy:      Head:      Right side of head: No tonsillar adenopathy. Left side of head: No tonsillar adenopathy. Cervical: No cervical adenopathy. Skin:     General: Skin is warm. Findings: Rash present. Rash is urticarial.   Neurological:      Mental Status: She is alert and oriented to person, place, and time. Psychiatric:         Behavior: Behavior normal.         DIAGNOSTIC RESULTS     Labs:No results found for this visit on 07/30/21. IMAGING:    No orders to display         EKG:  none    URGENT CARE COURSE:     Vitals:    07/30/21 0944   BP: (!) 142/90   Pulse: 107   Resp: 16   SpO2: 98%   Weight: 270 lb (122.5 kg)   Height: 5' 4\" (1.626 m)       Medications   methylPREDNISolone sodium (SOLU-MEDROL) injection 125 mg (125 mg Intramuscular Given 7/30/21 1019)            PROCEDURES:  None    FINAL IMPRESSION      1. Urticaria    2. Upper respiratory tract infection, unspecified type          DISPOSITION/ PLAN       Exam is consistent with an urticarial type rash and patient is given a shot of Solu-Medrol in the urgent care. She is also given a prescription for prednisone and Pepcid to take at home. She is advised to continue topical Benadryl and to follow-up with her PCP if the rash continues to be a recurring issue. She is advised to continue Mucinex for the viral upper respiratory infection as well as to mix in a Flonase nasal spray. She is agreeable to plan as discussed we will follow-up on an outpatient basis as needed.     PATIENT REFERRED TO:  Beverly Goodell, MD  [address] [facility] [address] / [facility] [address]      DISCHARGE MEDICATIONS:  New Prescriptions    FAMOTIDINE (PEPCID) 20 MG TABLET    Take 1 tablet by mouth 2 times daily    PREDNISONE (DELTASONE) 20 MG TABLET    Take 2 tablets by mouth daily for 7 days       Discontinued Medications    DOCUSATE SODIUM (COLACE) 100 MG CAPSULE    Take 100 mg by mouth daily    PRENATAL MV-MIN-FE FUM-FA-DHA (PRENATAL 1 PO)    Take 1 tablet by mouth daily       Current Discharge Medication List          KAYLYN Becker CNP    (Please note that portions of this note were completed with a voice recognition program. Efforts were made to edit the dictations but occasionally words are mis-transcribed.)          KAYLYN Becker CNP  07/30/21 8932

## 2021-11-18 ENCOUNTER — HOSPITAL ENCOUNTER (EMERGENCY)
Age: 36
Discharge: HOME | End: 2021-11-18
Attending: EMERGENCY MEDICINE
Payer: COMMERCIAL

## 2021-11-18 VITALS
SYSTOLIC BLOOD PRESSURE: 114 MMHG | DIASTOLIC BLOOD PRESSURE: 79 MMHG | HEIGHT: 65 IN | HEART RATE: 93 BPM | BODY MASS INDEX: 48.15 KG/M2 | TEMPERATURE: 98.5 F | WEIGHT: 289 LBS | OXYGEN SATURATION: 98 % | RESPIRATION RATE: 17 BRPM

## 2021-11-18 DIAGNOSIS — L50.9 URTICARIAL RASH: Primary | ICD-10-CM

## 2021-11-18 LAB
ALBUMIN SERPL-MCNC: 3.8 G/DL (ref 3.5–5.1)
ALP BLD-CCNC: 64 U/L (ref 38–126)
ALT SERPL-CCNC: 8 U/L (ref 11–66)
ANION GAP SERPL CALCULATED.3IONS-SCNC: 11 MEQ/L (ref 8–16)
AST SERPL-CCNC: 11 U/L (ref 5–40)
BASOPHILS # BLD: 0.1 %
BASOPHILS ABSOLUTE: 0 THOU/MM3 (ref 0–0.1)
BILIRUB SERPL-MCNC: < 0.2 MG/DL (ref 0.3–1.2)
BUN BLDV-MCNC: 15 MG/DL (ref 7–22)
C3 COMPLEMENT: 148 MG/DL (ref 90–180)
CALCIUM SERPL-MCNC: 9 MG/DL (ref 8.5–10.5)
CHLORIDE BLD-SCNC: 103 MEQ/L (ref 98–111)
CO2: 24 MEQ/L (ref 23–33)
COMPLEMENT C4: 19 MG/DL (ref 10–40)
CREAT SERPL-MCNC: 0.6 MG/DL (ref 0.4–1.2)
EOSINOPHIL # BLD: 0.2 %
EOSINOPHILS ABSOLUTE: 0 THOU/MM3 (ref 0–0.4)
ERYTHROCYTE [DISTWIDTH] IN BLOOD BY AUTOMATED COUNT: 13 % (ref 11.5–14.5)
ERYTHROCYTE [DISTWIDTH] IN BLOOD BY AUTOMATED COUNT: 40.3 FL (ref 35–45)
GFR SERPL CREATININE-BSD FRML MDRD: > 90 ML/MIN/1.73M2
GLUCOSE BLD-MCNC: 119 MG/DL (ref 70–108)
GROUP A STREP CULTURE, REFLEX: NEGATIVE
HCT VFR BLD CALC: 40.9 % (ref 37–47)
HEMOGLOBIN: 13.1 GM/DL (ref 12–16)
IMMATURE GRANS (ABS): 0.04 THOU/MM3 (ref 0–0.07)
IMMATURE GRANULOCYTES: 0.3 %
LYMPHOCYTES # BLD: 31.6 %
LYMPHOCYTES ABSOLUTE: 4 THOU/MM3 (ref 1–4.8)
MAGNESIUM: 1.9 MG/DL (ref 1.6–2.4)
MCH RBC QN AUTO: 27.3 PG (ref 26–33)
MCHC RBC AUTO-ENTMCNC: 32 GM/DL (ref 32.2–35.5)
MCV RBC AUTO: 85.2 FL (ref 81–99)
MONOCYTES # BLD: 3.8 %
MONOCYTES ABSOLUTE: 0.5 THOU/MM3 (ref 0.4–1.3)
NUCLEATED RED BLOOD CELLS: 0 /100 WBC
OSMOLALITY CALCULATION: 277.6 MOSMOL/KG (ref 275–300)
PLATELET # BLD: 432 THOU/MM3 (ref 130–400)
PMV BLD AUTO: 10.3 FL (ref 9.4–12.4)
POTASSIUM SERPL-SCNC: 3.3 MEQ/L (ref 3.5–5.2)
PREGNANCY, SERUM: NEGATIVE
RBC # BLD: 4.8 MILL/MM3 (ref 4.2–5.4)
REFLEX THROAT C + S: NORMAL
SARS-COV-2, NAAT: NOT  DETECTED
SEG NEUTROPHILS: 64 %
SEGMENTED NEUTROPHILS ABSOLUTE COUNT: 8.2 THOU/MM3 (ref 1.8–7.7)
SODIUM BLD-SCNC: 138 MEQ/L (ref 135–145)
T4 FREE: 0.95 NG/DL (ref 0.93–1.76)
TOTAL PROTEIN: 6.9 G/DL (ref 6.1–8)
TSH SERPL DL<=0.05 MIU/L-ACNC: 10.59 UIU/ML (ref 0.4–4.2)
WBC # BLD: 12.8 THOU/MM3 (ref 4.8–10.8)

## 2021-11-18 PROCEDURE — 96375 TX/PRO/DX INJ NEW DRUG ADDON: CPT

## 2021-11-18 PROCEDURE — P9017 PLASMA 1 DONOR FRZ W/IN 8 HR: HCPCS

## 2021-11-18 PROCEDURE — 80053 COMPREHEN METABOLIC PANEL: CPT

## 2021-11-18 PROCEDURE — 84439 ASSAY OF FREE THYROXINE: CPT

## 2021-11-18 PROCEDURE — 36415 COLL VENOUS BLD VENIPUNCTURE: CPT

## 2021-11-18 PROCEDURE — 96365 THER/PROPH/DIAG IV INF INIT: CPT

## 2021-11-18 PROCEDURE — 36430 TRANSFUSION BLD/BLD COMPNT: CPT

## 2021-11-18 PROCEDURE — 84443 ASSAY THYROID STIM HORMONE: CPT

## 2021-11-18 PROCEDURE — 83735 ASSAY OF MAGNESIUM: CPT

## 2021-11-18 PROCEDURE — 85025 COMPLETE CBC W/AUTO DIFF WBC: CPT

## 2021-11-18 PROCEDURE — 99284 EMERGENCY DEPT VISIT MOD MDM: CPT

## 2021-11-18 PROCEDURE — 86162 COMPLEMENT TOTAL (CH50): CPT

## 2021-11-18 PROCEDURE — 87880 STREP A ASSAY W/OPTIC: CPT

## 2021-11-18 PROCEDURE — 86332 IMMUNE COMPLEX ASSAY: CPT

## 2021-11-18 PROCEDURE — 2580000003 HC RX 258: Performed by: EMERGENCY MEDICINE

## 2021-11-18 PROCEDURE — 2500000003 HC RX 250 WO HCPCS: Performed by: EMERGENCY MEDICINE

## 2021-11-18 PROCEDURE — 6360000002 HC RX W HCPCS: Performed by: EMERGENCY MEDICINE

## 2021-11-18 PROCEDURE — 6370000000 HC RX 637 (ALT 250 FOR IP)

## 2021-11-18 PROCEDURE — 87635 SARS-COV-2 COVID-19 AMP PRB: CPT

## 2021-11-18 PROCEDURE — 87070 CULTURE OTHR SPECIMN AEROBIC: CPT

## 2021-11-18 PROCEDURE — 84703 CHORIONIC GONADOTROPIN ASSAY: CPT

## 2021-11-18 PROCEDURE — 86160 COMPLEMENT ANTIGEN: CPT

## 2021-11-18 RX ORDER — HYDROXYZINE HYDROCHLORIDE 10 MG/1
10 TABLET, FILM COATED ORAL 3 TIMES DAILY PRN
COMMUNITY

## 2021-11-18 RX ORDER — SODIUM CHLORIDE 9 MG/ML
INJECTION, SOLUTION INTRAVENOUS PRN
Status: DISCONTINUED | OUTPATIENT
Start: 2021-11-18 | End: 2021-11-18 | Stop reason: HOSPADM

## 2021-11-18 RX ORDER — PREDNISONE 10 MG/1
10 TABLET ORAL DAILY
COMMUNITY
End: 2022-02-16 | Stop reason: ALTCHOICE

## 2021-11-18 RX ORDER — TRANEXAMIC ACID 100 MG/ML
15 INJECTION, SOLUTION INTRAVENOUS ONCE
Status: DISCONTINUED | OUTPATIENT
Start: 2021-11-18 | End: 2021-11-18 | Stop reason: CLARIF

## 2021-11-18 RX ORDER — DIPHENHYDRAMINE HCL 25 MG
25 CAPSULE ORAL EVERY 6 HOURS PRN
Qty: 40 CAPSULE | Refills: 0 | Status: SHIPPED | OUTPATIENT
Start: 2021-11-18 | End: 2021-11-28

## 2021-11-18 RX ORDER — DIPHENHYDRAMINE HYDROCHLORIDE 50 MG/ML
25 INJECTION INTRAMUSCULAR; INTRAVENOUS ONCE
Status: COMPLETED | OUTPATIENT
Start: 2021-11-18 | End: 2021-11-18

## 2021-11-18 RX ORDER — NORETHINDRONE AND ETHINYL ESTRADIOL 0.4-0.035
KIT ORAL SEE ADMIN INSTRUCTIONS
COMMUNITY

## 2021-11-18 RX ORDER — FAMOTIDINE 20 MG/1
20 TABLET, FILM COATED ORAL 2 TIMES DAILY
Qty: 60 TABLET | Refills: 3 | Status: SHIPPED | OUTPATIENT
Start: 2021-11-18 | End: 2022-06-07

## 2021-11-18 RX ORDER — METHYLPREDNISOLONE SODIUM SUCCINATE 125 MG/2ML
60 INJECTION, POWDER, LYOPHILIZED, FOR SOLUTION INTRAMUSCULAR; INTRAVENOUS ONCE
Status: DISCONTINUED | OUTPATIENT
Start: 2021-11-18 | End: 2021-11-18

## 2021-11-18 RX ADMIN — FAMOTIDINE 20 MG: 10 INJECTION INTRAVENOUS at 03:09

## 2021-11-18 RX ADMIN — TRANEXAMIC ACID 1967 MG: 1 INJECTION, SOLUTION INTRAVENOUS at 03:22

## 2021-11-18 RX ADMIN — DIPHENHYDRAMINE HYDROCHLORIDE 25 MG: 50 INJECTION, SOLUTION INTRAMUSCULAR; INTRAVENOUS at 03:06

## 2021-11-18 RX ADMIN — Medication 5 ML: at 04:43

## 2021-11-18 ASSESSMENT — ENCOUNTER SYMPTOMS
SORE THROAT: 0
SINUS PRESSURE: 0
TROUBLE SWALLOWING: 0
EYE PAIN: 0
EYE DISCHARGE: 0
NAUSEA: 0
SHORTNESS OF BREATH: 0
BLOOD IN STOOL: 0
CHOKING: 0
VOICE CHANGE: 0
RHINORRHEA: 0
PHOTOPHOBIA: 0
CHEST TIGHTNESS: 0
VOMITING: 0
BACK PAIN: 0
COUGH: 0
ABDOMINAL PAIN: 0
EYE ITCHING: 0
ABDOMINAL DISTENTION: 0
EYE REDNESS: 0
DIARRHEA: 0
CONSTIPATION: 0
WHEEZING: 0

## 2021-11-18 ASSESSMENT — PAIN SCALES - GENERAL
PAINLEVEL_OUTOF10: 8
PAINLEVEL_OUTOF10: 8

## 2021-11-18 ASSESSMENT — PAIN DESCRIPTION - LOCATION
LOCATION: GENERALIZED
LOCATION: THROAT

## 2021-11-18 ASSESSMENT — PAIN DESCRIPTION - PAIN TYPE: TYPE: ACUTE PAIN

## 2021-11-18 NOTE — ED TRIAGE NOTES
Patient arrives ambulatory through triage with complaints of hives causing swelling to eyes and mouth. States she has had intermittent hives that have been ongoing since July, being referred to rheumatologist for possible Lupus but unable to get in until December. Was started on prednisone yesterday, last dose at 0730. Also took benadryl at home at 2130 but states that she coughed so hard she vomited so she is unsure if that stayed down. Denies airway issues, respirations easy and unlabored. Attempted hydroxazine on Sunday without relief.

## 2021-11-18 NOTE — ED PROVIDER NOTES
[facility]  eMERGENCY dEPARTMENT eNCOUnter          [address] [facility]       Chief Complaint   Patient presents with    Urticaria    Facial Swelling       Nurses Notes reviewed and I agree except as noted in the HPI. HISTORY OF PRESENT ILLNESS    Tomer George is a 31 y.o. female who presents with an urticarial rash. For started in July. Patient states she has never had this rash before. And patient has had it off and on. Patient states that it started to worsen over the last 2 days. She called her primary care provider who placed her on steroids. She states she took steroids yesterday morning and yesterday evening and she feels that is worsening. Patient has used Pepcid once she also thinks it worsened from that. Patient states that she used Benadryl last night but it did not do anything. Patient states that she has not been subjected to any new chemicals. She has not changed her job. She has not changed any medications. She denies any new changes in soaps detergents or dryer sheets. She has not had any new bedding put on her bed. Here today the patient states that the rash is very itchy, is not painful, she feels like she is having some face and lip swelling. Patient is otherwise resting comfortably on the cot no apparent distress hemodynamically stable 100% on room air. Patient is able to handle secretions without any difficulty. REVIEW OF SYSTEMS     Review of Systems   Constitutional: Negative for activity change, appetite change, diaphoresis, fatigue and unexpected weight change. HENT: Negative for congestion, ear discharge, ear pain, hearing loss, rhinorrhea, sinus pressure, sore throat, trouble swallowing and voice change. Eyes: Negative for photophobia, pain, discharge, redness and itching. Respiratory: Negative for cough, choking, chest tightness, shortness of breath and wheezing. Cardiovascular: Negative for chest pain, palpitations and leg swelling. Gastrointestinal: Negative for abdominal distention, abdominal pain, blood in stool, constipation, diarrhea, nausea and vomiting. Endocrine: Negative for polydipsia, polyphagia and polyuria. Genitourinary: Negative for decreased urine volume, difficulty urinating, dysuria, enuresis, frequency, hematuria and urgency. Musculoskeletal: Negative for arthralgias, back pain, gait problem, myalgias, neck pain and neck stiffness. Skin: Positive for rash (Diffuse urticarial rash over body). Negative for pallor. Allergic/Immunologic: Negative for immunocompromised state. Neurological: Negative for dizziness, tremors, seizures, syncope, facial asymmetry, weakness, light-headedness, numbness and headaches. Hematological: Negative for adenopathy. Does not bruise/bleed easily. Psychiatric/Behavioral: Negative for agitation, hallucinations and suicidal ideas. The patient is not nervous/anxious. PAST MEDICAL HISTORY    has a past medical history of Abnormal Pap smear of cervix and Arthritis. SURGICAL HISTORY      has a past surgical history that includes Knee arthroscopy and [place] tooth extraction. CURRENT MEDICATIONS       Previous Medications    HYDROXYZINE (ATARAX) 10 MG TABLET    Take 10 mg by mouth 3 times daily as needed for Itching    NORETHINDRONE-ETHINYL ESTRADIOL (VYFEMLA) 0.4-35 MG-MCG PER TABLET    Take by mouth See Admin Instructions    PREDNISONE (DELTASONE) 10 MG TABLET    Take 10 mg by mouth daily       ALLERGIES     is allergic to sulfa antibiotics. FAMILY HISTORY     She indicated that her mother is alive. She indicated that her father is alive. She indicated that the status of her brother is unknown.   family history includes Cancer in her father and mother; Diabetes in her brother and mother; High Blood Pressure in her mother; Stroke in her father. SOCIAL HISTORY      reports that she has never smoked. She has never used smokeless tobacco. She reports previous alcohol use. She reports that she does not use drugs. PHYSICAL EXAM     INITIAL VITALS:  height is 5' 5\" (1.651 m) and weight is 289 lb (131.1 kg). Her oral temperature is 98.5 °F (36.9 °C). Her blood pressure is 114/79 and her pulse is 93. Her respiration is 17 and oxygen saturation is 98%. Physical Exam  Vitals and nursing note reviewed. Constitutional:       General: She is not in acute distress. Appearance: She is well-developed. She is not diaphoretic. HENT:      Head: Normocephalic and atraumatic. Right Ear: External ear normal.      Left Ear: External ear normal.      Nose: Nose normal.      Mouth/Throat:      Lips: Pink. Mouth: Mucous membranes are moist.      Tongue: No lesions. Palate: No mass. Pharynx: Oropharynx is clear. No pharyngeal swelling, oropharyngeal exudate, posterior oropharyngeal erythema or uvula swelling. Tonsils: No tonsillar exudate or tonsillar abscesses. Comments: Bilateral tonsillar stones no erythema edema or swelling. Eyes:      General: No scleral icterus. Right eye: No discharge. Left eye: No discharge. Conjunctiva/sclera: Conjunctivae normal.      Pupils: Pupils are equal, round, and reactive to light. Neck:      Thyroid: No thyromegaly. Vascular: No JVD. Trachea: No tracheal deviation. Cardiovascular:      Rate and Rhythm: Normal rate and regular rhythm. Heart sounds: Normal heart sounds, S1 normal and S2 normal. No murmur heard. No friction rub. No gallop. Pulmonary:      Effort: Pulmonary effort is normal.      Breath sounds: Normal breath sounds. No stridor. No wheezing, rhonchi or rales. Chest:      Chest wall: No tenderness. Abdominal:      General: Bowel sounds are normal. There is no distension. Palpations: Abdomen is soft. There is no mass. Tenderness: There is no abdominal tenderness. There is no guarding or rebound. Hernia: No hernia is present.    Musculoskeletal: General: No tenderness. Normal range of motion. Cervical back: Normal range of motion and neck supple. Lymphadenopathy:      Cervical: No cervical adenopathy. Skin:     General: Skin is warm and dry. Capillary Refill: Capillary refill takes less than 2 seconds. Findings: Rash present. No bruising, ecchymosis or lesion. Rash is urticarial.      Comments: Diffuse urticarial rash with wheals all over the body. Neurological:      Mental Status: She is alert and oriented to person, place, and time. Cranial Nerves: No cranial nerve deficit. Sensory: No sensory deficit. Motor: No weakness. Coordination: Coordination normal.      Gait: Gait normal.      Deep Tendon Reflexes: Reflexes are normal and symmetric. Reflexes normal.   Psychiatric:         Mood and Affect: Mood normal.         Speech: Speech normal.         Behavior: Behavior normal.         Thought Content:  Thought content normal.         Judgment: Judgment normal.           DIFFERENTIAL DIAGNOSIS:   Urticaria rash, contact dermatitis, medication reaction, food allergy, angioedema    DIAGNOSTIC RESULTS     EKG: All EKG's are interpreted by the Emergency Department Physician who either signs or Co-signs this chart in the absence of a cardiologist.  None    RADIOLOGY: non-plain film images(s) such as CT, Ultrasound and MRI are read by the radiologist.  No orders to display         LABS:   Labs Reviewed   CBC WITH AUTO DIFFERENTIAL - Abnormal; Notable for the following components:       Result Value    WBC 12.8 (*)     MCHC 32.0 (*)     Platelets 198 (*)     Segs Absolute 8.2 (*)     All other components within normal limits   TSH WITHOUT REFLEX - Abnormal; Notable for the following components:    TSH 10.590 (*)     All other components within normal limits   COMPREHENSIVE METABOLIC PANEL - Abnormal; Notable for the following components:    Glucose 119 (*)     Potassium 3.3 (*)     Total Bilirubin <0.2 (*)     ALT 8 (*) All other components within normal limits   COVID-19, RAPID   CULTURE, THROAT    Narrative:     Source: Specimen not received       Site:           Current Antibiotics:   MAGNESIUM   HCG, SERUM, QUALITATIVE   ANION GAP   GLOMERULAR FILTRATION RATE, ESTIMATED   OSMOLALITY   GROUP A STREP, REFLEX   T4, FREE   C1Q BINDING ASSAY   C3 COMPLEMENT   C4 COMPLEMENT   COMPLEMENT, TOTAL   PREPARE FRESH FROZEN PLASMA    Narrative:     [id number]     issued   TYPE AND SCREEN    Narrative:     ANTI-N (MNS) Significant       EMERGENCY DEPARTMENT COURSE:   Vitals:    Vitals:    11/18/21 0555 11/18/21 0606 11/18/21 0611 11/18/21 0616   BP: 101/81 114/78 114/77 114/79   Pulse: 96 96 91 93   Resp: 14 17 17 17   Temp: 98.5 °F (36.9 °C) 98.6 °F (37 °C) 98.6 °F (37 °C) 98.5 °F (36.9 °C)   TempSrc: Oral Oral Oral Oral   SpO2: 99% 100% 99% 98%   Weight:       Height:         Patient was assessed at bedside appropriate labs and imaging were ordered. Pepcid was given as well as Benadryl. I then gave her TXA. Because the patient stated that this was sudden onset she will get a rule out for hereditary angioedema as well. She is going to see a rheumatologist coming up. She states that she was found to have a history of RA. Here today I reviewed the labs. They are all within normal limits. I did get angioedema labs. She is scheduled to follow-up. Patient had some minor improvement with the tranexamic acid. I also gave her FFP. She was given Pepcid and Benadryl. Patient had the rash I found out before. She was given a shot and she felt better. This was steroids. However she is convinced that the steroids this time around may have worsened the rash. In any event we withheld steroids. I recommended to the patient that if she goes 48 hours and the rash is still persistent she should retry the steroids because this is more than likely not the cause.   I did reinforce with her that she needed to think about her current soaps that she has in the house detergents and fabric softeners as it is often the innocuous things that create this problem. This was discussed with the patient and  at bedside who understood and agreed with plan. Patient is subsequently discharged home in stable condition. Patient has what appears to be urticarial rash. Patient is instructed to use the Pepcid and Benadryl as directed. She is instructed to follow-up with a primary care physician so within the next 1 to 2 days. She is instructed return to the nearest emergency room immediately for any new or worsening complaints. CRITICAL CARE:   None    CONSULTS:  None    PROCEDURES:  None    FINAL IMPRESSION      1.  Urticarial rash          DISPOSITION/PLAN   Discharge    PATIENT REFERRED TO:  Miky Ayala MD  [address] [facility]  [phone]    Call in 2 days        DISCHARGE MEDICATIONS:  New Prescriptions    DIPHENHYDRAMINE (BENADRYL ALLERGY) 25 MG CAPSULE    Take 1 capsule by mouth every 6 hours as needed for Itching    FAMOTIDINE (PEPCID) 20 MG TABLET    Take 1 tablet by mouth 2 times daily       (Please note that portions of this note were completed with a voice recognition program.  Efforts were made to edit the dictations but occasionally words are mis-transcribed.)    Franco Levi, [address] [facility], DO  11/18/21 7067

## 2021-11-18 NOTE — ED NOTES
RN observed pt for the first 15 minutes of transfusion of FFP. Pt denies any onset of pain, hypersensitivity reaction, shortness of breath, etc. Pt is resting in cot with eyes closed,  is at bedside. Lights are dimmed for comfort. Call light is within reach. Will continue to monitor.       Benji Lozano RN  11/18/21 2135

## 2021-11-18 NOTE — ED NOTES
Pt states her throat is hurting and is sore and would like something for the pain, RN spoke with Dr. Tabatha Valverde. RN medicated pt per [facility] and provided pt with ice chips. RN updated pt on POC. Pt voices no concern or need at this time. Call light is within reach. Will continue to monitor.       Bhavik Ragland RN  11/18/21 5417

## 2021-11-20 LAB — THROAT/NOSE CULTURE: NORMAL

## 2021-11-26 LAB — C1Q BINDING: NORMAL

## 2022-01-11 ENCOUNTER — HOSPITAL ENCOUNTER (OUTPATIENT)
Dept: ULTRASOUND IMAGING | Age: 37
Discharge: HOME | End: 2022-01-11
Payer: COMMERCIAL

## 2022-01-11 ENCOUNTER — HOSPITAL ENCOUNTER (OUTPATIENT)
Dept: GENERAL RADIOLOGY | Age: 37
Discharge: HOME | End: 2022-01-11
Payer: COMMERCIAL

## 2022-01-11 ENCOUNTER — HOSPITAL ENCOUNTER (OUTPATIENT)
Age: 37
Discharge: HOME | End: 2022-01-11
Payer: COMMERCIAL

## 2022-01-11 DIAGNOSIS — R06.02 SHORTNESS OF BREATH: ICD-10-CM

## 2022-01-11 DIAGNOSIS — E04.1 RIGHT THYROID NODULE: ICD-10-CM

## 2022-01-11 PROCEDURE — 71046 X-RAY EXAM CHEST 2 VIEWS: CPT

## 2022-01-11 PROCEDURE — 76536 US EXAM OF HEAD AND NECK: CPT

## 2022-01-28 ENCOUNTER — TELEPHONE (OUTPATIENT)
Dept: SURGERY | Age: 37
End: 2022-01-28

## 2022-01-28 DIAGNOSIS — E04.1 RIGHT THYROID NODULE: Primary | ICD-10-CM

## 2022-01-31 NOTE — TELEPHONE ENCOUNTER
STR US FINE NEEDLE ASPIRATION  PREPS:  * Arrive 15 minutes prior  * LIGHT MEAL only on day of test  * No blood thinners 5 days prior  * If the patient is on Coumadin a current INR is required  * No Heparin 12 hours prior  * No Lovenox SQ 24hrs  * Bring a list of current medications  * Please report to Main Radiology 1st floor     Patient scheduled on 2/10/2022 @ 2pm Arrive at 130pm. Main radiology.  Patient made aware

## 2022-01-31 NOTE — TELEPHONE ENCOUNTER
Called IR, LM letting them know pt needs set up for FNA biopsy, order already in [facility]. Called pt, explained that Dr. Jaquelin Peterson does not do FNA biopsies, this is something done by IR and they will contact patient to get this set up.    Dr. Jaquelin Peterson would like to f/u after biopsy so appointment r/s'd to 2/16 at 9:15 am  Pt voiced understanding

## 2022-02-10 ENCOUNTER — HOSPITAL ENCOUNTER (OUTPATIENT)
Dept: ULTRASOUND IMAGING | Age: 37
Discharge: HOME | End: 2022-02-10
Payer: COMMERCIAL

## 2022-02-10 DIAGNOSIS — E04.1 RIGHT THYROID NODULE: ICD-10-CM

## 2022-02-10 PROCEDURE — 88172 CYTP DX EVAL FNA 1ST EA SITE: CPT

## 2022-02-10 PROCEDURE — 88173 CYTOPATH EVAL FNA REPORT: CPT

## 2022-02-10 PROCEDURE — 60300 ASPIR/INJ THYROID CYST: CPT

## 2022-02-10 PROCEDURE — 88177 CYTP FNA EVAL EA ADDL: CPT

## 2022-02-10 PROCEDURE — 76942 ECHO GUIDE FOR BIOPSY: CPT

## 2022-02-16 ENCOUNTER — OFFICE VISIT (OUTPATIENT)
Dept: SURGERY | Age: 37
End: 2022-02-16
Payer: COMMERCIAL

## 2022-02-16 VITALS
HEIGHT: 65 IN | WEIGHT: 293 LBS | SYSTOLIC BLOOD PRESSURE: 128 MMHG | OXYGEN SATURATION: 98 % | BODY MASS INDEX: 48.82 KG/M2 | DIASTOLIC BLOOD PRESSURE: 60 MMHG | HEART RATE: 62 BPM | TEMPERATURE: 96.2 F | RESPIRATION RATE: 18 BRPM

## 2022-02-16 DIAGNOSIS — E04.2 MULTIPLE THYROID NODULES: Primary | ICD-10-CM

## 2022-02-16 PROCEDURE — G8417 CALC BMI ABV UP PARAM F/U: HCPCS | Performed by: SURGERY

## 2022-02-16 PROCEDURE — G8427 DOCREV CUR MEDS BY ELIG CLIN: HCPCS | Performed by: SURGERY

## 2022-02-16 PROCEDURE — 99204 OFFICE O/P NEW MOD 45 MIN: CPT | Performed by: SURGERY

## 2022-02-16 PROCEDURE — G8484 FLU IMMUNIZE NO ADMIN: HCPCS | Performed by: SURGERY

## 2022-02-16 PROCEDURE — 1036F TOBACCO NON-USER: CPT | Performed by: SURGERY

## 2022-02-16 NOTE — LETTER
[address] [facility]  Surgery  [place] [id number] [facility] [address]  Phone: [phone]  Fax: [phone]           Mishel Cardoso MD      February 17, 2022     Patient: Lloyd Alvarado   MR Number: [id number]   YOB: 1985   Date of Visit: 2/16/2022       Dear Dr. Torres April:    Thank you for referring Iris Arias to me for evaluation/treatment. Below are the relevant portions of my assessment and plan of care. ASSESSMENT:  1.  Multinodular goiter  2. Left-sided thyroid nodule with atypia  3. Hypothyroidism    PLAN:  1. Schedule Shandra for total thyroidectomy. 2. She will undergo pre-operative clearance per anesthesia guidelines with risk factors listed under the past medical history diagnosis & problem list.  3. The risks, benefits and alternatives were discussed with Lee Castañeda including non-operative management. All questions answered. She understands and wishes to proceed with surgical intervention. 4. Restrictions discussed with Lee Castañeda and she expresses understanding. 5. She is advised to call back directly if there are further questions/concerns, or if her symptoms worsen prior to surgery. --Discussed with patient about options of observation with repeat imaging 3-4 months, repeat FNA and thyroidectomy. Discussed left lobectomy versus total thyroidectomy. All questions answered. If you have questions, please do not hesitate to call me. I look forward to following Lee Castañeda along with you.     Sincerely,    Mishel Cardoso MD

## 2022-02-17 ASSESSMENT — ENCOUNTER SYMPTOMS
BLOOD IN STOOL: 0
ABDOMINAL PAIN: 0
STRIDOR: 0
SHORTNESS OF BREATH: 0
WHEEZING: 0
COUGH: 0
APNEA: 0
CONSTIPATION: 0
SINUS PRESSURE: 0
ABDOMINAL DISTENTION: 0
FACIAL SWELLING: 0
EYE ITCHING: 0
VOICE CHANGE: 0
EYE PAIN: 0
BACK PAIN: 0
TROUBLE SWALLOWING: 0
CHEST TIGHTNESS: 0
NAUSEA: 0
DIARRHEA: 0
ANAL BLEEDING: 0
RHINORRHEA: 0
EYE REDNESS: 0
ALLERGIC/IMMUNOLOGIC NEGATIVE: 1
COLOR CHANGE: 0
VOMITING: 0
PHOTOPHOBIA: 0
EYE DISCHARGE: 0
CHOKING: 0
SORE THROAT: 0
RECTAL PAIN: 0

## 2022-02-17 NOTE — PROGRESS NOTES
Lloyd Nose (:  1985)     ASSESSMENT:  1.  Multinodular goiter  2. Left-sided thyroid nodule with atypia  3. Hypothyroidism    PLAN:  1. Schedule Shandra for total thyroidectomy. 2. She will undergo pre-operative clearance per anesthesia guidelines with risk factors listed under the past medical history diagnosis & problem list.  3. The risks, benefits and alternatives were discussed with Lee Castañeda including non-operative management. All questions answered. She understands and wishes to proceed with surgical intervention. 4. Restrictions discussed with Lee Castañeda and she expresses understanding. 5. She is advised to call back directly if there are further questions/concerns, or if her symptoms worsen prior to surgery. --Discussed with patient about options of observation with repeat imaging 3-4 months, repeat FNA and thyroidectomy. Discussed left lobectomy versus total thyroidectomy. All questions answered. SUBJECTIVE/OBJECTIVE:    Chief Complaint   Patient presents with    Surgical Consult     New patient-referred by Dr Homer Galeas nodules    Results     FNA biopsy done 2/10/2022     HPI  Lee Castañeda is a 79-year-old female presents for initial evaluation secondary to thyroid disease. She has had complaints of unexpected weight gain. Fatigue. Chills. No sweats or fevers. Found to have significant hypothyroidism with TSH 10. Currently on replacement. Ultrasound demonstrated multiple thyroid nodules. 2 separate large nodules on the right measuring 2.2 cm and 2 cm. Both were fine-needle aspirated that demonstrated follicular tissue but no concern for malignancy/atypia. Also a 6.7 cm left thyroid nodule that demonstrated atypia on FNA. She admits to some throat enlargement due to the thyroid. Denies any significant dysphagia. Still able to tolerate regular diet. No nausea or vomiting. No chest or abdominal pain. Normal bowel function. No hematochezia or melena. No new urinary complaints. Denies significant palpitations. No lightheadedness or dizziness. Denies significant heat or cold intolerance. Admits mother had thyroid cancer requiring thyroidectomy in the past.    Review of Systems   Constitutional: Positive for chills, fatigue and unexpected weight change. Negative for activity change, appetite change, diaphoresis and fever. HENT: Negative for congestion, dental problem, drooling, ear discharge, ear pain, facial swelling, hearing loss, mouth sores, nosebleeds, postnasal drip, rhinorrhea, sinus pressure, sneezing, sore throat, tinnitus, trouble swallowing and voice change. Eyes: Negative for photophobia, pain, discharge, redness, itching and visual disturbance. Respiratory: Negative for apnea, cough, choking, chest tightness, shortness of breath, wheezing and stridor. Cardiovascular: Negative for chest pain, palpitations and leg swelling. Gastrointestinal: Negative for abdominal distention, abdominal pain, anal bleeding, blood in stool, constipation, diarrhea, nausea, rectal pain and vomiting. Endocrine: Negative. Genitourinary: Negative for decreased urine volume, difficulty urinating, dyspareunia, dysuria, enuresis, flank pain, frequency, genital sores, hematuria, menstrual problem, pelvic pain, urgency, vaginal bleeding, vaginal discharge and vaginal pain. Musculoskeletal: Negative for arthralgias, back pain, gait problem, joint swelling, myalgias, neck pain and neck stiffness. Skin: Positive for rash. Negative for color change, pallor and wound. Allergic/Immunologic: Negative. Neurological: Negative for dizziness, tremors, seizures, syncope, facial asymmetry, speech difficulty, weakness, light-headedness, numbness and headaches. Hematological: Negative for adenopathy. Does not bruise/bleed easily.    Psychiatric/Behavioral: Negative for agitation, behavioral problems, confusion, decreased concentration, dysphoric mood, hallucinations, self-injury, sleep disturbance and suicidal ideas. The patient is not nervous/anxious and is not hyperactive. Past Medical History:   Diagnosis Date    Abnormal Pap smear of cervix     cervical polyp    Arthritis     Chronic idiopathic urticaria     sees Dr Arturo Durant in [place]    Obesity     Thyroid nodule 02/2022       Past Surgical History:   Procedure Laterality Date    KNEE ARTHROSCOPY Left     US THYROID CYST ASPIRATION AND OR INJECTION  02/10/2022    US THYROID CYST ASPIRATION AND OR INJECTION 2/10/2022 Ara Albert MD [facility]    WISDOM TOOTH EXTRACTION         Current Outpatient Medications   Medication Sig Dispense Refill    diphenhydrAMINE HCl (BENADRYL ALLERGY PO) Take by mouth as needed      norethindrone-ethinyl estradiol (VYFEMLA) 0.4-35 MG-MCG per tablet Take by mouth See Admin Instructions      hydrOXYzine (ATARAX) 10 MG tablet Take 10 mg by mouth 3 times daily as needed for Itching      famotidine (PEPCID) 20 MG tablet Take 1 tablet by mouth 2 times daily 60 tablet 3     No current facility-administered medications for this visit.        Allergies   Allergen Reactions    Prednisone Hives    Sulfa Antibiotics Rash       Family History   Problem Relation Age of Onset    Other Mother         skin cancer    Cancer Mother         thyroid    Diabetes Mother     High Blood Pressure Mother     Cancer Father         prostate    Stroke Father     Diabetes Brother     Cancer Maternal Grandmother         possible thyroid maybe nodules       Social History     Socioeconomic History    Marital status:      Spouse name: Not on file    Number of children: Not on file    Years of education: Not on file    Highest education level: Not on file   Occupational History    Not on file   Tobacco Use    Smoking status: Never Smoker    Smokeless tobacco: Never Used   Vaping Use    Vaping Use: Never used   Substance and Sexual Activity    Alcohol use: Not Currently     Comment: social  Drug use: No    Sexual activity: Yes     Partners: Male   Other Topics Concern    Not on file   Social History Narrative    Not on file     Social Determinants of Health     Financial Resource Strain:     Difficulty of Paying Living Expenses: Not on file   Food Insecurity:     Worried About Running Out of Food in the Last Year: Not on file    July of Food in the Last Year: Not on file   Transportation Needs:     Lack of Transportation (Medical): Not on file    Lack of Transportation (Non-Medical): Not on file   Physical Activity:     Days of Exercise per Week: Not on file    Minutes of Exercise per Session: Not on file   Stress:     Feeling of Stress : Not on file   Social Connections:     Frequency of Communication with Friends and Family: Not on file    Frequency of Social Gatherings with Friends and Family: Not on file    Attends Restorationism Services: Not on file    Active Member of Clubs or Organizations: Not on file    Attends Club or Organization Meetings: Not on file    Marital Status: Not on file   Intimate Partner Violence:     Fear of Current or Ex-Partner: Not on file    Emotionally Abused: Not on file    Physically Abused: Not on file    Sexually Abused: Not on file   Housing Stability:     Unable to Pay for Housing in the Last Year: Not on file    Number of [place] in the Last Year: Not on file    Unstable Housing in the Last Year: Not on file     Vitals:    02/16/22 0917   BP: 128/60   Site: Left Upper Arm   Position: Sitting   Cuff Size: Medium Adult   Pulse: 62   Resp: 18   Temp: 96.2 °F (35.7 °C)   TempSrc: Temporal   SpO2: 98%   Weight: 294 lb 6.4 oz (133.5 kg)   Height: 5' 5\" (1.651 m)     Body mass index is 48.99 kg/m². Wt Readings from Last 3 Encounters:   02/16/22 294 lb 6.4 oz (133.5 kg)   11/18/21 289 lb (131.1 kg)   07/30/21 270 lb (122.5 kg)     Physical Exam  Vitals reviewed. Constitutional:       General: She is not in acute distress.      Appearance: She is well-developed. She is not diaphoretic. HENT:      Head: Normocephalic and atraumatic. Right Ear: External ear normal.      Left Ear: External ear normal.      Nose: Nose normal.   Eyes:      General: No scleral icterus. Right eye: No discharge. Left eye: No discharge. Conjunctiva/sclera: Conjunctivae normal.   Neck:      Thyroid: Thyroid mass and thyromegaly present. No thyroid tenderness. Trachea: Trachea and phonation normal.     Cardiovascular:      Rate and Rhythm: Normal rate and regular rhythm. Heart sounds: Normal heart sounds. Pulmonary:      Effort: Pulmonary effort is normal. No respiratory distress. Breath sounds: Normal breath sounds. No wheezing or rales. Chest:      Chest wall: No tenderness. Abdominal:      General: Bowel sounds are normal. There is no distension. Palpations: Abdomen is soft. There is no mass. Tenderness: There is no abdominal tenderness. There is no guarding or rebound. Musculoskeletal:         General: No tenderness. Normal range of motion. Cervical back: Normal range of motion and neck supple. No erythema. No spinous process tenderness or muscular tenderness. Normal range of motion. Lymphadenopathy:      Cervical: No cervical adenopathy. Skin:     General: Skin is warm and dry. Coloration: Skin is not pale. Findings: No erythema or rash. Neurological:      Mental Status: She is alert and oriented to person, place, and time. Cranial Nerves: No cranial nerve deficit. Psychiatric:         Behavior: Behavior normal.         Thought Content:  Thought content normal.         Judgment: Judgment normal.       Lab Results   Component Value Date    WBC 12.8 (H) 11/18/2021    HGB 13.1 11/18/2021    HCT 40.9 11/18/2021    MCV 85.2 11/18/2021     (H) 11/18/2021     Lab Results   Component Value Date     11/18/2021    K 3.3 (L) 11/18/2021     11/18/2021    CO2 24 11/18/2021     Lab Results   Component Value Date    CREATININE 0.6 2021     Lab Results   Component Value Date    ALT 8 (L) 2021    AST 11 2021    ALKPHOS 64 2021    BILITOT <0.2 (L) 2021     No results found for: LIPASE    Lab Results   Component Value Date    TSH 10.590 (H) 2021     T4, Free 0.95 (low normal)    [facility]      LUIS ANGEL KAM                     [id number]   Assoc.                                              Page 1 of 7 [id number]   [facility] [id number]                                                       PROC: 2022   [facility]                                    RECV: 2022   [address]                                    RPTD: 02/15/2022   [facility] [id number]                       MRN:  [id number]    LOC:                        ACCT: [id number]  SEX: F                       : 1985  AGE: 36 Y                          PATHOLOGY REPORT                       ATTN: FERNANDO FRAZIER                       REQ: Ciara Munoz      Copies To:   Severino Carter     Clinical Information:  RIGHT THYROID NODULES, LEFT THYROID NODULES, A.   RIGHT, MULTIPLE, COMPLEX, R1 SUP POLE, 2.2 CM, B. RIGHT MULTIPLE, R2,   SOLID, LOWER POLE, 2.0 CM, C. LEFT, MULTIPLE, L1, 6.7 CM, SOLID     RAPID ONSITE EVALUATION:   A. #1 occasional follicular cells, #2 few follicular cells. Overall   adequate. ALP   B. #1 follicular cells. Adequate. C. #1 mostly blood, #2 follicular cells. Adequate. AMENDED REPORT 2/15/22:  A change in the diagnosis for part C was made   after intradepartmental quality review. FINAL RESULTS:   A. Right thyroid, superior pole 2.2 cm, FNA:               BENIGN       Follicular cells, colloid and cyst contents consistent with a       benign follicular nodule. B. Right thyroid, lower pole 2.0 cm, FNA:    BENIGN       Follicular cells, colloid and cyst contents consistent with a       benign follicular nodule.      C. Left thyroid, 6.7 cm, FNA:               ATYPIA OF UNDETERMINED SIGNIFICANCE.     Follicular cells with patchy architectural atypia (microfollicular       pattern).     Comment:  Flakito Garcia and Marlena bradshaw. SOURCE:   A) FINE NEEDLE ASPIRATE THYROID, RIGHT, R1, SUP POLE     Source Description:                 Materials Prepared & Examined:                                       Smear Slides. .............. 4   Color. .......... Pink               Monolayers. ............... Donzell Pyo 1   Consistency. .. Donzell Pyo Donzell Pyo Donzell Pyo Thin   Other. ............... Donzell Pyo Less than 1    mL, floaters     B) FINE NEEDLE ASPIRATE THYROID, RIGHT, R2, LOWER POLE     Source Description:                 Materials Prepared & Examined:                                       Smear Slides. .............. 2   Color. .......... Pink               Monolayers. ............... Donzell Pyo 1   Consistency. .. Donzell Pyo Donzell Pyo Donzell Pyo Thin   Other. ............... Donzell Pyo Less than 1    mL     C) FINE NEEDLE ASPIRATE THYROID, LEFT NODULE, L1     Source Description:                 Materials Prepared & Examined:                                       Smear Slides. .............. 4   Color. .......... Pink               Monolayers. ............... Donzell Pyo 1   Consistency. .. Donzell Pyo Donzell Pyo Donzell Pyo Thin   Other. ............... Donzell Pyo Less than 1    mL                                                Kailey Flaherty M.D., F.C. A. P     Patient Active Problem List   Diagnosis    Vaginal delivery     Narrative   PROCEDURE: US THYROID       CLINICAL INFORMATION: Right thyroid nodule       COMPARISON: None       TECHNIQUE: Grayscale and color sonographic imaging of the thyroid gland performed in longitudinal and transverse planes.               FINDINGS:        THYROID SIZE:       The right thyroid lobe measures 5.9 x 3 x 2.3 cm.       The left thyroid lobe measures 7.6 x 4.4 x 3.2 cm.       The isthmus measures 3 mm       ECHOTEXTURE: Inhomogeneous.       RIGHT LOBE NODULES:    1.  There is a 2.2 x 2.2 x 1.6 cm markedly hypoechoic nodule in the mid right thyroid lobe with lobulated borders (points =7 ; TR 5). 2. A 2 x 1.7 x 1.8 cm isoechoic nodule in the inferior right thyroid lobe is taller than wide and has lobulated borders (points=8; TR 5)       LEFT LOBE NODULES:    1. There is a 6.7 x 5 x 3.6 cm isoechoic nodule encompassing the left thyroid lobe (TR 3)       ISTHMUS NODULES: None           CERVICAL LYMPHADENOPATHY:    1. There are no pathologically enlarged lymph nodes adjacent to the thyroid gland.               Impression   1. A 2.2 cm markedly hypoechoic nodule in the right mid thyroid lobe is a TR 5 lesion. 2. A 2 cm isoechoic nodule in the inferior right thyroid lobe is a TR 5 lesion. 3. The 6.7 cm isoechoic nodule in the left thyroid lobe is a TR 3 lesion. 4. These nodules are amenable to ultrasound-guided fine-needle aspiration.                      ACR TI-RADS Category and recommendations       TR 1: 0 point. Benign. No FNA    TR 2: 1,2 points-Not suspicious. No FNA    TR 3: 3 points -Mildly suspicion. FNA is recommended for lesions greater than 2.5 cm; follow up if the nodule is greater than or equal to 1.5 cm. Follow-up can be done at 1, 3 and 5 years. Continued follow-up after 5 years can be obtained if there is no    stability in size. TR 4: 4-6 points: Moderately suspicion. FNA greater than or equal to 1.5 cm; follow-up if the nodule is greater than or equal to 1 cm- follow-up can be done at 1, 2, 3 and 5 years. Continued follow-up after 5 years can be obtained if there is no    stability in size. TR 5: 7+points -Highly Suspicious. FNA greater than 1 cm, follow-up if greater than or equal to 0.5 cm       **This report has been created using voice recognition software.  It may contain minor errors which are inherent in voice recognition technology. **       Final report electronically signed by Dr Iftikhar Peoples on 1/12/2022 8:51 AM     Narrative   PROCEDURE: US ASP/INJ THYROID CYST       CLINICAL INFORMATION: 62-year-old female with bilateral thyroid nodules.     COMPARISON: Comparison is made with ultrasound dated 1/11/2022.       CONSENT: The risks, benefits and alternatives of the procedure were discussed with the patient. Verbal and signed informed consent was obtained.        TIMEOUT: A timeout was performed to confirm the correct patient, procedure and site prior to performing this procedure.        LOCALIZATION: With the patient in the supine position, the target nodule(s) was identified by ultrasound. A skin site was marked and the skin was prepped and draped in the usual sterile fashion. Local anesthesia was obtained with 2% local lidocaine.       LOCATION: 6.2 x 5.2 x 3.4 cm and the left lobe of the thyroid gland. 2.0 x 1.9 x 1.5 cm and 1.9 x 2.2 x 1.9 cm nodules in the right thyroid lobe.       NEEDLE(s): 25-gauge       FINDINGS:       1 needle pass was performed of the target lesion in the right lobe of the thyroid gland which measured 1.9 x 2.2 x 1.9 cm. To needle passes were performed at the other lesions described above. This was all performed under ultrasound guidance. A    cytopathologist was present on-site to determine the sample to be adequate.           Impression       Status post ultrasound-guided fine needle aspiration of nodules in both lobes of thyroid gland.           **This report has been created using voice recognition software. It may contain minor errors which are inherent in voice recognition technology. **       Final report electronically signed by Dr Wendy Jon on 2/11/2022 8:47 AM       An electronic signature was used to authenticate this note.     --Rich Carpenter MD

## 2022-02-22 ENCOUNTER — TELEPHONE (OUTPATIENT)
Dept: SURGERY | Age: 37
End: 2022-02-22

## 2022-02-22 DIAGNOSIS — E04.2 MULTINODULAR GOITER: Primary | ICD-10-CM

## 2022-02-22 DIAGNOSIS — Z01.818 PRE-OP TESTING: ICD-10-CM

## 2022-03-10 DIAGNOSIS — E04.2 MULTINODULAR GOITER: Primary | ICD-10-CM

## 2022-05-02 ENCOUNTER — NURSE ONLY (OUTPATIENT)
Dept: LAB | Age: 37
End: 2022-05-02

## 2022-05-05 LAB — CYTOLOGY THIN PREP PAP: NORMAL

## 2022-05-06 NOTE — H&P
Sonya Sanjay (:  1985)      ASSESSMENT:  1.  Multinodular goiter  2. Left-sided thyroid nodule with atypia  3. Hypothyroidism     PLAN:  1. Schedule Shandra for total thyroidectomy. 2. She will undergo pre-operative clearance per anesthesia guidelines with risk factors listed under the past medical history diagnosis & problem list.  3. The risks, benefits and alternatives were discussed with [facility] including non-operative management. All questions answered. She understands and wishes to proceed with surgical intervention. 4. Restrictions discussed with [facility] and she expresses understanding. 5. She is advised to call back directly if there are further questions/concerns, or if her symptoms worsen prior to surgery.     --Discussed with patient about options of observation with repeat imaging 3-4 months, repeat FNA and thyroidectomy. Discussed left lobectomy versus total thyroidectomy. All questions answered.     SUBJECTIVE/OBJECTIVE:          Chief Complaint   Patient presents with    Surgical Consult       New patient-referred by Dr Cash Sensing nodules    Results       FNA biopsy done 2/10/2022      HPI  [facility] is a 51-year-old female presents for initial evaluation secondary to thyroid disease. She has had complaints of unexpected weight gain. Fatigue. Chills. No sweats or fevers. Found to have significant hypothyroidism with TSH 10. Currently on replacement. Ultrasound demonstrated multiple thyroid nodules. 2 separate large nodules on the right measuring 2.2 cm and 2 cm. Both were fine-needle aspirated that demonstrated follicular tissue but no concern for malignancy/atypia. Also a 6.7 cm left thyroid nodule that demonstrated atypia on FNA. She admits to some throat enlargement due to the thyroid. Denies any significant dysphagia. Still able to tolerate regular diet. No nausea or vomiting. No chest or abdominal pain. Normal bowel function. No hematochezia or melena.   No new urinary complaints. Denies significant palpitations. No lightheadedness or dizziness. Denies significant heat or cold intolerance. Admits mother had thyroid cancer requiring thyroidectomy in the past.     Review of Systems   Constitutional: Positive for chills, fatigue and unexpected weight change. Negative for activity change, appetite change, diaphoresis and fever. HENT: Negative for congestion, dental problem, drooling, ear discharge, ear pain, facial swelling, hearing loss, mouth sores, nosebleeds, postnasal drip, rhinorrhea, sinus pressure, sneezing, sore throat, tinnitus, trouble swallowing and voice change. Eyes: Negative for photophobia, pain, discharge, redness, itching and visual disturbance. Respiratory: Negative for apnea, cough, choking, chest tightness, shortness of breath, wheezing and stridor. Cardiovascular: Negative for chest pain, palpitations and leg swelling. Gastrointestinal: Negative for abdominal distention, abdominal pain, anal bleeding, blood in stool, constipation, diarrhea, nausea, rectal pain and vomiting. Endocrine: Negative. Genitourinary: Negative for decreased urine volume, difficulty urinating, dyspareunia, dysuria, enuresis, flank pain, frequency, genital sores, hematuria, menstrual problem, pelvic pain, urgency, vaginal bleeding, vaginal discharge and vaginal pain. Musculoskeletal: Negative for arthralgias, back pain, gait problem, joint swelling, myalgias, neck pain and neck stiffness. Skin: Positive for rash. Negative for color change, pallor and wound. Allergic/Immunologic: Negative. Neurological: Negative for dizziness, tremors, seizures, syncope, facial asymmetry, speech difficulty, weakness, light-headedness, numbness and headaches. Hematological: Negative for adenopathy. Does not bruise/bleed easily.    Psychiatric/Behavioral: Negative for agitation, behavioral problems, confusion, decreased concentration, dysphoric mood, hallucinations, self-injury, sleep disturbance and suicidal ideas.  The patient is not nervous/anxious and is not hyperactive.          Past Medical History        Past Medical History:   Diagnosis Date    Abnormal Pap smear of cervix       cervical polyp    Arthritis      Chronic idiopathic urticaria       sees Dr Domo Vincent in [place]      Thyroid nodule 02/2022            Past Surgical History         Past Surgical History:   Procedure Laterality Date    KNEE ARTHROSCOPY Left      US THYROID CYST ASPIRATION AND OR INJECTION   02/10/2022     US THYROID CYST ASPIRATION AND OR INJECTION 2/10/2022 MD SUSHMA Lenz ULTRASOUND    WISDOM TOOTH EXTRACTION                Current Facility-Administered Medications   Current Outpatient Medications   Medication Sig Dispense Refill    diphenhydrAMINE HCl (BENADRYL ALLERGY PO) Take by mouth as needed        norethindrone-ethinyl estradiol (VYFEMLA) 0.4-35 MG-MCG per tablet Take by mouth See Admin Instructions        hydrOXYzine (ATARAX) 10 MG tablet Take 10 mg by mouth 3 times daily as needed for Itching        famotidine (PEPCID) 20 MG tablet Take 1 tablet by mouth 2 times daily 60 tablet 3      No current facility-administered medications for this visit.                 Allergies   Allergen Reactions    Prednisone Hives    Sulfa Antibiotics Rash         Family History         Family History   Problem Relation Age of Onset    Other Mother           skin cancer    Cancer Mother           thyroid    Diabetes Mother      High Blood Pressure Mother      Cancer Father           prostate    Stroke Father      Diabetes Brother      Cancer Maternal Grandmother           possible thyroid maybe nodules            Social History               Socioeconomic History    Marital status:        Spouse name: Not on file    Number of children: Not on file    Years of education: Not on file    Highest education level: Not on file   Occupational History    Not on file Tobacco Use    Smoking status: Never Smoker    Smokeless tobacco: Never Used   Vaping Use    Vaping Use: Never used   Substance and Sexual Activity    Alcohol use: Not Currently       Comment: social    Drug use: No    Sexual activity: Yes       Partners: Male   Other Topics Concern    Not on file   Social History Narrative    Not on file      Social Determinants of Health          Financial Resource Strain:     Difficulty of Paying Living Expenses: Not on file   Food Insecurity:     Worried About Running Out of Food in the Last Year: Not on file    July of Food in the Last Year: Not on file   Transportation Needs:     Lack of Transportation (Medical): Not on file    Lack of Transportation (Non-Medical):  Not on file   Physical Activity:     Days of Exercise per Week: Not on file    Minutes of Exercise per Session: Not on file   Stress:     Feeling of Stress : Not on file   Social Connections:     Frequency of Communication with Friends and Family: Not on file    Frequency of Social Gatherings with Friends and Family: Not on file    Attends Druze Services: Not on file    Active Member of Clubs or Organizations: Not on file    Attends Club or Organization Meetings: Not on file    Marital Status: Not on file   Intimate Partner Violence:     Fear of Current or Ex-Partner: Not on file    Emotionally Abused: Not on file    Physically Abused: Not on file    Sexually Abused: Not on file   Housing Stability:     Unable to Pay for Housing in the Last Year: Not on file    Number of [place] in the Last Year: Not on file    Unstable Housing in the Last Year: Not on file         Vitals       Vitals:     02/16/22 0917   BP: 128/60   Site: Left Upper Arm   Position: Sitting   Cuff Size: Medium Adult   Pulse: 62   Resp: 18   Temp: 96.2 °F (35.7 °C)   TempSrc: Temporal   SpO2: 98%   Weight: 294 lb 6.4 oz (133.5 kg)   Height: 5' 5\" (1.651 m)         Body mass index is 48.99 kg/m².         Wt Readings from Last 3 Encounters:   02/16/22 294 lb 6.4 oz (133.5 kg)   11/18/21 289 lb (131.1 kg)   07/30/21 270 lb (122.5 kg)      Physical Exam  Vitals reviewed. Constitutional:       General: She is not in acute distress. Appearance: She is well-developed. She is not diaphoretic. HENT:      Head: Normocephalic and atraumatic. Right Ear: External ear normal.      Left Ear: External ear normal.      Nose: Nose normal.   Eyes:      General: No scleral icterus. Right eye: No discharge. Left eye: No discharge. Conjunctiva/sclera: Conjunctivae normal.   Neck:      Thyroid: Thyroid mass and thyromegaly present. No thyroid tenderness. Trachea: Trachea and phonation normal.     Cardiovascular:      Rate and Rhythm: Normal rate and regular rhythm. Heart sounds: Normal heart sounds. Pulmonary:      Effort: Pulmonary effort is normal. No respiratory distress. Breath sounds: Normal breath sounds. No wheezing or rales. Chest:      Chest wall: No tenderness. Abdominal:      General: Bowel sounds are normal. There is no distension. Palpations: Abdomen is soft. There is no mass. Tenderness: There is no abdominal tenderness. There is no guarding or rebound. Musculoskeletal:         General: No tenderness. Normal range of motion. Cervical back: Normal range of motion and neck supple. No erythema. No spinous process tenderness or muscular tenderness. Normal range of motion. Lymphadenopathy:      Cervical: No cervical adenopathy. Skin:     General: Skin is warm and dry. Coloration: Skin is not pale. Findings: No erythema or rash. Neurological:      Mental Status: She is alert and oriented to person, place, and time. Cranial Nerves: No cranial nerve deficit. Psychiatric:         Behavior: Behavior normal.         Thought Content:  Thought content normal.         Judgment: Judgment normal.               Lab Results   Component Value Date     WBC 12.8 (H) 2021     HGB 13.1 2021     HCT 40.9 2021     MCV 85.2 2021      (H) 2021            Lab Results   Component Value Date      2021     K 3.3 (L) 2021      2021     CO2 24 2021            Lab Results   Component Value Date     CREATININE 0.6 2021            Lab Results   Component Value Date     ALT 8 (L) 2021     AST 11 2021     ALKPHOS 64 2021     BILITOT <0.2 (L) 2021      No results found for: LIPASE           Lab Results   Component Value Date     TSH 10.590 (H) 2021      T4, Free 0.95 (low normal)     [facility]      LUIS ANGEL                     [id number]   Assoc.                                              Page 1 of 1   [facility] [address]   [address] [place], [place] [address]                                                       PROC: 2022   [facility]                                    RECV: 2022   [address]                                    RPTD: 02/15/2022   [address] [place], [place] [address]                       MRN:  [id number]    LOC:                        ACCT: [id number]  SEX: F                       : 1985  AGE: 36 Y                          PATHOLOGY REPORT                       ATTN: FERNANDO FRAZIER                       REQ: Kelly Hare      Copies To:   Yazmin Arriaga     Clinical Information:  RIGHT THYROID NODULES, LEFT THYROID NODULES, A.   RIGHT, MULTIPLE, COMPLEX, R1 SUP POLE, 2.2 CM, B. RIGHT MULTIPLE, R2,   SOLID, LOWER POLE, 2.0 CM, C. LEFT, MULTIPLE, L1, 6.7 CM, SOLID     RAPID ONSITE EVALUATION:   A. #1 occasional follicular cells, #2 few follicular cells. Overall   adequate. ALP   B. #1 follicular cells. Adequate. C. #1 mostly blood, #2 follicular cells. Adequate. AMENDED REPORT 2/15/22:  A change in the diagnosis for part C was made   after intradepartmental quality review. FINAL RESULTS:   A.  Right thyroid, superior pole 2.2 cm, FNA:               BENIGN       Follicular cells, colloid and cyst contents consistent with a       benign follicular nodule. B. Right thyroid, lower pole 2.0 cm, FNA:    BENIGN       Follicular cells, colloid and cyst contents consistent with a       benign follicular nodule. C. Left thyroid, 6.7 cm, FNA:               ATYPIA OF UNDETERMINED SIGNIFICANCE.     Follicular cells with patchy architectural atypia (microfollicular       pattern).     Comment:  Flakito Salmon and Pooja bradshaw. SOURCE:   A) FINE NEEDLE ASPIRATE THYROID, RIGHT, R1, SUP POLE     Source Description:                 Materials Prepared & Examined:                                       Smear Slides. .............. 4   Color. .......... Pink               Monolayers. ............... Kathrene Bolk 1   Consistency. .. Kathrene Bolk Kathrene Bolk Kathrene Bolk Thin   Other. ............... Kathrene Bolk Less than 1    mL, floaters     B) FINE NEEDLE ASPIRATE THYROID, RIGHT, R2, LOWER POLE     Source Description:                 Materials Prepared & Examined:                                       Smear Slides. .............. 2   Color. .......... Pink               Monolayers. ............... Kathrene Bolk 1   Consistency. .. Kathrene Bolk Kathrene Bolk Kathrene Bolk Thin   Other. ............... Kathrene Bolk Less than 1    mL     C) FINE NEEDLE ASPIRATE THYROID, LEFT NODULE, L1     Source Description:                 Materials Prepared & Examined:                                       Smear Slides. .............. 4   Color. .......... Pink               Monolayers. ............... Kathrene Bolk 1   Consistency. .. Kathrene Bolk Kathrene Bolk Kathrene Bolk Thin   Other. ............... Kathrene Bolk Less than 1    mL                                                Rashida Negron M.D., F.C. A. P          Patient Active Problem List   Diagnosis    Vaginal delivery      Narrative   PROCEDURE: US THYROID       CLINICAL INFORMATION: Right thyroid nodule       COMPARISON: None       TECHNIQUE: Grayscale and color sonographic imaging of the thyroid gland performed in longitudinal and transverse planes.               FINDINGS:        THYROID SIZE:     The right thyroid lobe measures 5.9 x 3 x 2.3 cm.       The left thyroid lobe measures 7.6 x 4.4 x 3.2 cm.       The isthmus measures 3 mm       ECHOTEXTURE: Inhomogeneous.       RIGHT LOBE NODULES:    1. There is a 2.2 x 2.2 x 1.6 cm markedly hypoechoic nodule in the mid right thyroid lobe with lobulated borders (points =7 ; TR 5). 2. A 2 x 1.7 x 1.8 cm isoechoic nodule in the inferior right thyroid lobe is taller than wide and has lobulated borders (points=8; TR 5)       LEFT LOBE NODULES:    1. There is a 6.7 x 5 x 3.6 cm isoechoic nodule encompassing the left thyroid lobe (TR 3)       ISTHMUS NODULES: None           CERVICAL LYMPHADENOPATHY:    1. There are no pathologically enlarged lymph nodes adjacent to the thyroid gland.               Impression   1. A 2.2 cm markedly hypoechoic nodule in the right mid thyroid lobe is a TR 5 lesion. 2. A 2 cm isoechoic nodule in the inferior right thyroid lobe is a TR 5 lesion. 3. The 6.7 cm isoechoic nodule in the left thyroid lobe is a TR 3 lesion. 4. These nodules are amenable to ultrasound-guided fine-needle aspiration.                       ACR TI-RADS Category and recommendations       TR 1: 0 point. Benign. No FNA    TR 2: 1,2 points-Not suspicious. No FNA    TR 3: 3 points -Mildly suspicion. FNA is recommended for lesions greater than 2.5 cm; follow up if the nodule is greater than or equal to 1.5 cm. Follow-up can be done at 1, 3 and 5 years. Continued follow-up after 5 years can be obtained if there is no    stability in size. TR 4: 4-6 points: Moderately suspicion. FNA greater than or equal to 1.5 cm; follow-up if the nodule is greater than or equal to 1 cm- follow-up can be done at 1, 2, 3 and 5 years. Continued follow-up after 5 years can be obtained if there is no    stability in size. TR 5: 7+points -Highly Suspicious.  FNA greater than 1 cm, follow-up if greater than or equal to 0.5 cm       **This report has been created using voice recognition software.  It may contain minor errors which are inherent in voice recognition technology. **       Final report electronically signed by Dr Hilary Gallardo on 1/12/2022 8:51 AM      Narrative   PROCEDURE: US ASP/INJ THYROID CYST       CLINICAL INFORMATION: 72-year-old female with bilateral thyroid nodules.       COMPARISON: Comparison is made with ultrasound dated 1/11/2022.       CONSENT: The risks, benefits and alternatives of the procedure were discussed with the patient. Verbal and signed informed consent was obtained.        TIMEOUT: A timeout was performed to confirm the correct patient, procedure and site prior to performing this procedure.        LOCALIZATION: With the patient in the supine position, the target nodule(s) was identified by ultrasound. A skin site was marked and the skin was prepped and draped in the usual sterile fashion. Local anesthesia was obtained with 2% local lidocaine.       LOCATION: 6.2 x 5.2 x 3.4 cm and the left lobe of the thyroid gland. 2.0 x 1.9 x 1.5 cm and 1.9 x 2.2 x 1.9 cm nodules in the right thyroid lobe.       NEEDLE(s): 25-gauge       FINDINGS:       1 needle pass was performed of the target lesion in the right lobe of the thyroid gland which measured 1.9 x 2.2 x 1.9 cm. To needle passes were performed at the other lesions described above. This was all performed under ultrasound guidance. A    cytopathologist was present on-site to determine the sample to be adequate.           Impression       Status post ultrasound-guided fine needle aspiration of nodules in both lobes of thyroid gland.           **This report has been created using voice recognition software. It may contain minor errors which are inherent in voice recognition technology. **       Final report electronically signed by Dr Reyna Nieto on 2/11/2022 8:47 AM         An electronic signature was used to authenticate this note.  Neel Orta MD

## 2022-05-09 ENCOUNTER — HOSPITAL ENCOUNTER (OUTPATIENT)
Age: 37
Discharge: HOME | End: 2022-05-09
Payer: COMMERCIAL

## 2022-05-09 ENCOUNTER — PREP FOR PROCEDURE (OUTPATIENT)
Dept: SURGERY | Age: 37
End: 2022-05-09

## 2022-05-09 DIAGNOSIS — E04.2 MULTINODULAR GOITER: ICD-10-CM

## 2022-05-09 DIAGNOSIS — Z01.818 PRE-OP TESTING: ICD-10-CM

## 2022-05-09 LAB
HCT VFR BLD CALC: 40.1 % (ref 37–47)
HEMOGLOBIN: 12.5 GM/DL (ref 12–16)
T4 FREE: 1.06 NG/DL (ref 0.93–1.76)
TSH SERPL DL<=0.05 MIU/L-ACNC: 1.94 UIU/ML (ref 0.4–4.2)

## 2022-05-09 PROCEDURE — 85018 HEMOGLOBIN: CPT

## 2022-05-09 PROCEDURE — 36415 COLL VENOUS BLD VENIPUNCTURE: CPT

## 2022-05-09 PROCEDURE — 84443 ASSAY THYROID STIM HORMONE: CPT

## 2022-05-09 PROCEDURE — 84439 ASSAY OF FREE THYROXINE: CPT

## 2022-05-09 PROCEDURE — 85014 HEMATOCRIT: CPT

## 2022-05-09 RX ORDER — SODIUM CHLORIDE 9 MG/ML
INJECTION, SOLUTION INTRAVENOUS CONTINUOUS
Status: CANCELLED | OUTPATIENT
Start: 2022-05-09

## 2022-05-10 ENCOUNTER — ANESTHESIA (OUTPATIENT)
Dept: OPERATING ROOM | Age: 37
End: 2022-05-10
Payer: COMMERCIAL

## 2022-05-10 ENCOUNTER — HOSPITAL ENCOUNTER (OUTPATIENT)
Age: 37
Discharge: HOME | End: 2022-05-11
Attending: SURGERY | Admitting: SURGERY
Payer: COMMERCIAL

## 2022-05-10 ENCOUNTER — ANESTHESIA EVENT (OUTPATIENT)
Dept: OPERATING ROOM | Age: 37
End: 2022-05-10
Payer: COMMERCIAL

## 2022-05-10 VITALS — DIASTOLIC BLOOD PRESSURE: 92 MMHG | OXYGEN SATURATION: 96 % | SYSTOLIC BLOOD PRESSURE: 121 MMHG | TEMPERATURE: 86.7 F

## 2022-05-10 DIAGNOSIS — E89.0 S/P TOTAL THYROIDECTOMY: Primary | ICD-10-CM

## 2022-05-10 PROBLEM — Z98.890 S/P TOTAL THYROIDECTOMY: Status: ACTIVE | Noted: 2022-05-10

## 2022-05-10 PROBLEM — Z90.89 S/P TOTAL THYROIDECTOMY: Status: ACTIVE | Noted: 2022-05-10

## 2022-05-10 LAB
CALCIUM IONIZED: 1.07 MMOL/L (ref 1.12–1.32)
CALCIUM IONIZED: 1.29 MMOL/L (ref 1.12–1.32)
PREGNANCY, URINE: NEGATIVE

## 2022-05-10 PROCEDURE — 2580000003 HC RX 258

## 2022-05-10 PROCEDURE — 6370000000 HC RX 637 (ALT 250 FOR IP): Performed by: SURGERY

## 2022-05-10 PROCEDURE — 2500000003 HC RX 250 WO HCPCS: Performed by: SURGERY

## 2022-05-10 PROCEDURE — 1200000000 HC SEMI PRIVATE

## 2022-05-10 PROCEDURE — 88305 TISSUE EXAM BY PATHOLOGIST: CPT

## 2022-05-10 PROCEDURE — 2709999900 HC NON-CHARGEABLE SUPPLY: Performed by: SURGERY

## 2022-05-10 PROCEDURE — 36415 COLL VENOUS BLD VENIPUNCTURE: CPT

## 2022-05-10 PROCEDURE — 6360000002 HC RX W HCPCS: Performed by: NURSE ANESTHETIST, CERTIFIED REGISTERED

## 2022-05-10 PROCEDURE — 60252 REMOVAL OF THYROID: CPT | Performed by: SURGERY

## 2022-05-10 PROCEDURE — 6360000002 HC RX W HCPCS: Performed by: SURGERY

## 2022-05-10 PROCEDURE — 6360000002 HC RX W HCPCS

## 2022-05-10 PROCEDURE — 2720000010 HC SURG SUPPLY STERILE: Performed by: SURGERY

## 2022-05-10 PROCEDURE — 81025 URINE PREGNANCY TEST: CPT

## 2022-05-10 PROCEDURE — 3600000013 HC SURGERY LEVEL 3 ADDTL 15MIN: Performed by: SURGERY

## 2022-05-10 PROCEDURE — 7100000000 HC PACU RECOVERY - FIRST 15 MIN: Performed by: SURGERY

## 2022-05-10 PROCEDURE — 88307 TISSUE EXAM BY PATHOLOGIST: CPT

## 2022-05-10 PROCEDURE — 6370000000 HC RX 637 (ALT 250 FOR IP): Performed by: NURSE ANESTHETIST, CERTIFIED REGISTERED

## 2022-05-10 PROCEDURE — 7100000001 HC PACU RECOVERY - ADDTL 15 MIN: Performed by: SURGERY

## 2022-05-10 PROCEDURE — 82330 ASSAY OF CALCIUM: CPT

## 2022-05-10 PROCEDURE — 6360000002 HC RX W HCPCS: Performed by: PHYSICIAN ASSISTANT

## 2022-05-10 PROCEDURE — 2580000003 HC RX 258: Performed by: NURSE ANESTHETIST, CERTIFIED REGISTERED

## 2022-05-10 PROCEDURE — 3700000001 HC ADD 15 MINUTES (ANESTHESIA): Performed by: SURGERY

## 2022-05-10 PROCEDURE — 3600000003 HC SURGERY LEVEL 3 BASE: Performed by: SURGERY

## 2022-05-10 PROCEDURE — 6360000002 HC RX W HCPCS: Performed by: ANESTHESIOLOGY

## 2022-05-10 PROCEDURE — 2500000003 HC RX 250 WO HCPCS: Performed by: NURSE ANESTHETIST, CERTIFIED REGISTERED

## 2022-05-10 PROCEDURE — 2580000003 HC RX 258: Performed by: SURGERY

## 2022-05-10 PROCEDURE — 3700000000 HC ANESTHESIA ATTENDED CARE: Performed by: SURGERY

## 2022-05-10 RX ORDER — SODIUM CHLORIDE 9 MG/ML
INJECTION, SOLUTION INTRAVENOUS PRN
Status: DISCONTINUED | OUTPATIENT
Start: 2022-05-10 | End: 2022-05-11 | Stop reason: HOSPADM

## 2022-05-10 RX ORDER — HYDROMORPHONE HCL 110MG/55ML
PATIENT CONTROLLED ANALGESIA SYRINGE INTRAVENOUS PRN
Status: DISCONTINUED | OUTPATIENT
Start: 2022-05-10 | End: 2022-05-10 | Stop reason: SDUPTHER

## 2022-05-10 RX ORDER — KETOROLAC TROMETHAMINE 30 MG/ML
INJECTION, SOLUTION INTRAMUSCULAR; INTRAVENOUS
Status: COMPLETED
Start: 2022-05-10 | End: 2022-05-10

## 2022-05-10 RX ORDER — FENTANYL CITRATE 50 UG/ML
INJECTION, SOLUTION INTRAMUSCULAR; INTRAVENOUS PRN
Status: DISCONTINUED | OUTPATIENT
Start: 2022-05-10 | End: 2022-05-10 | Stop reason: SDUPTHER

## 2022-05-10 RX ORDER — KETOROLAC TROMETHAMINE 30 MG/ML
15 INJECTION, SOLUTION INTRAMUSCULAR; INTRAVENOUS EVERY 6 HOURS
Status: DISCONTINUED | OUTPATIENT
Start: 2022-05-10 | End: 2022-05-11 | Stop reason: HOSPADM

## 2022-05-10 RX ORDER — MORPHINE SULFATE 2 MG/ML
2 INJECTION, SOLUTION INTRAMUSCULAR; INTRAVENOUS
Status: DISCONTINUED | OUTPATIENT
Start: 2022-05-10 | End: 2022-05-11 | Stop reason: HOSPADM

## 2022-05-10 RX ORDER — CALCIUM GLUCONATE 20 MG/ML
2000 INJECTION, SOLUTION INTRAVENOUS ONCE
Status: COMPLETED | OUTPATIENT
Start: 2022-05-10 | End: 2022-05-10

## 2022-05-10 RX ORDER — SODIUM CHLORIDE 0.9 % (FLUSH) 0.9 %
5-40 SYRINGE (ML) INJECTION PRN
Status: DISCONTINUED | OUTPATIENT
Start: 2022-05-10 | End: 2022-05-11 | Stop reason: HOSPADM

## 2022-05-10 RX ORDER — ENOXAPARIN SODIUM 100 MG/ML
30 INJECTION SUBCUTANEOUS EVERY 12 HOURS
Status: DISCONTINUED | OUTPATIENT
Start: 2022-05-11 | End: 2022-05-11 | Stop reason: HOSPADM

## 2022-05-10 RX ORDER — SODIUM CHLORIDE 0.9 % (FLUSH) 0.9 %
5-40 SYRINGE (ML) INJECTION EVERY 12 HOURS SCHEDULED
Status: DISCONTINUED | OUTPATIENT
Start: 2022-05-10 | End: 2022-05-11 | Stop reason: HOSPADM

## 2022-05-10 RX ORDER — ROCURONIUM BROMIDE 10 MG/ML
INJECTION, SOLUTION INTRAVENOUS PRN
Status: DISCONTINUED | OUTPATIENT
Start: 2022-05-10 | End: 2022-05-10 | Stop reason: SDUPTHER

## 2022-05-10 RX ORDER — DROPERIDOL 2.5 MG/ML
0.62 INJECTION, SOLUTION INTRAMUSCULAR; INTRAVENOUS EVERY 6 HOURS PRN
Status: DISCONTINUED | OUTPATIENT
Start: 2022-05-10 | End: 2022-05-10 | Stop reason: HOSPADM

## 2022-05-10 RX ORDER — SODIUM CHLORIDE 0.9 % (FLUSH) 0.9 %
5-40 SYRINGE (ML) INJECTION PRN
Status: DISCONTINUED | OUTPATIENT
Start: 2022-05-10 | End: 2022-05-10 | Stop reason: HOSPADM

## 2022-05-10 RX ORDER — SODIUM CHLORIDE 9 MG/ML
INJECTION, SOLUTION INTRAVENOUS PRN
Status: DISCONTINUED | OUTPATIENT
Start: 2022-05-10 | End: 2022-05-10 | Stop reason: HOSPADM

## 2022-05-10 RX ORDER — SODIUM CHLORIDE, SODIUM LACTATE, POTASSIUM CHLORIDE, CALCIUM CHLORIDE 600; 310; 30; 20 MG/100ML; MG/100ML; MG/100ML; MG/100ML
INJECTION, SOLUTION INTRAVENOUS CONTINUOUS PRN
Status: DISCONTINUED | OUTPATIENT
Start: 2022-05-10 | End: 2022-05-10 | Stop reason: SDUPTHER

## 2022-05-10 RX ORDER — ONDANSETRON 2 MG/ML
4 INJECTION INTRAMUSCULAR; INTRAVENOUS
Status: DISCONTINUED | OUTPATIENT
Start: 2022-05-10 | End: 2022-05-10 | Stop reason: HOSPADM

## 2022-05-10 RX ORDER — FAMOTIDINE 20 MG/1
20 TABLET, FILM COATED ORAL 2 TIMES DAILY
Status: DISCONTINUED | OUTPATIENT
Start: 2022-05-10 | End: 2022-05-11 | Stop reason: HOSPADM

## 2022-05-10 RX ORDER — SCOLOPAMINE TRANSDERMAL SYSTEM 1 MG/1
PATCH, EXTENDED RELEASE TRANSDERMAL PRN
Status: DISCONTINUED | OUTPATIENT
Start: 2022-05-10 | End: 2022-05-10 | Stop reason: SDUPTHER

## 2022-05-10 RX ORDER — SODIUM CHLORIDE 9 MG/ML
INJECTION, SOLUTION INTRAVENOUS CONTINUOUS
Status: DISCONTINUED | OUTPATIENT
Start: 2022-05-10 | End: 2022-05-10 | Stop reason: HOSPADM

## 2022-05-10 RX ORDER — KETOROLAC TROMETHAMINE 30 MG/ML
INJECTION, SOLUTION INTRAMUSCULAR; INTRAVENOUS PRN
Status: DISCONTINUED | OUTPATIENT
Start: 2022-05-10 | End: 2022-05-10 | Stop reason: SDUPTHER

## 2022-05-10 RX ORDER — MIDAZOLAM HYDROCHLORIDE 1 MG/ML
INJECTION INTRAMUSCULAR; INTRAVENOUS PRN
Status: DISCONTINUED | OUTPATIENT
Start: 2022-05-10 | End: 2022-05-10 | Stop reason: SDUPTHER

## 2022-05-10 RX ORDER — MORPHINE SULFATE 2 MG/ML
4 INJECTION, SOLUTION INTRAMUSCULAR; INTRAVENOUS
Status: DISCONTINUED | OUTPATIENT
Start: 2022-05-10 | End: 2022-05-11 | Stop reason: HOSPADM

## 2022-05-10 RX ORDER — ONDANSETRON 2 MG/ML
4 INJECTION INTRAMUSCULAR; INTRAVENOUS EVERY 6 HOURS PRN
Status: DISCONTINUED | OUTPATIENT
Start: 2022-05-10 | End: 2022-05-11 | Stop reason: HOSPADM

## 2022-05-10 RX ORDER — LEVOTHYROXINE SODIUM 0.05 MG/1
50 TABLET ORAL DAILY
Status: DISCONTINUED | OUTPATIENT
Start: 2022-05-11 | End: 2022-05-11 | Stop reason: HOSPADM

## 2022-05-10 RX ORDER — ONDANSETRON 4 MG/1
4 TABLET, ORALLY DISINTEGRATING ORAL EVERY 8 HOURS PRN
Status: DISCONTINUED | OUTPATIENT
Start: 2022-05-10 | End: 2022-05-11 | Stop reason: HOSPADM

## 2022-05-10 RX ORDER — MEPERIDINE HYDROCHLORIDE 25 MG/ML
12.5 INJECTION INTRAMUSCULAR; INTRAVENOUS; SUBCUTANEOUS EVERY 5 MIN PRN
Status: DISCONTINUED | OUTPATIENT
Start: 2022-05-10 | End: 2022-05-10 | Stop reason: HOSPADM

## 2022-05-10 RX ORDER — KETAMINE HYDROCHLORIDE 50 MG/ML
INJECTION, SOLUTION, CONCENTRATE INTRAMUSCULAR; INTRAVENOUS PRN
Status: DISCONTINUED | OUTPATIENT
Start: 2022-05-10 | End: 2022-05-10 | Stop reason: SDUPTHER

## 2022-05-10 RX ORDER — FEXOFENADINE HCL 180 MG/1
180 TABLET ORAL DAILY
COMMUNITY

## 2022-05-10 RX ORDER — DIPHENHYDRAMINE HYDROCHLORIDE 50 MG/ML
12.5 INJECTION INTRAMUSCULAR; INTRAVENOUS
Status: DISCONTINUED | OUTPATIENT
Start: 2022-05-10 | End: 2022-05-10 | Stop reason: HOSPADM

## 2022-05-10 RX ORDER — HYDROCODONE BITARTRATE AND ACETAMINOPHEN 5; 325 MG/1; MG/1
1 TABLET ORAL EVERY 4 HOURS PRN
Status: DISCONTINUED | OUTPATIENT
Start: 2022-05-10 | End: 2022-05-11 | Stop reason: HOSPADM

## 2022-05-10 RX ORDER — LEVOTHYROXINE SODIUM 0.05 MG/1
50 TABLET ORAL DAILY
COMMUNITY
End: 2022-06-07 | Stop reason: ALTCHOICE

## 2022-05-10 RX ORDER — LIDOCAINE HYDROCHLORIDE 20 MG/ML
INJECTION, SOLUTION INFILTRATION; PERINEURAL PRN
Status: DISCONTINUED | OUTPATIENT
Start: 2022-05-10 | End: 2022-05-10 | Stop reason: SDUPTHER

## 2022-05-10 RX ORDER — FENTANYL CITRATE 50 UG/ML
50 INJECTION, SOLUTION INTRAMUSCULAR; INTRAVENOUS EVERY 5 MIN PRN
Status: DISCONTINUED | OUTPATIENT
Start: 2022-05-10 | End: 2022-05-10 | Stop reason: HOSPADM

## 2022-05-10 RX ORDER — BUPIVACAINE HYDROCHLORIDE 5 MG/ML
INJECTION, SOLUTION PERINEURAL PRN
Status: DISCONTINUED | OUTPATIENT
Start: 2022-05-10 | End: 2022-05-10 | Stop reason: ALTCHOICE

## 2022-05-10 RX ORDER — SODIUM CHLORIDE 9 MG/ML
INJECTION, SOLUTION INTRAVENOUS CONTINUOUS
Status: DISCONTINUED | OUTPATIENT
Start: 2022-05-10 | End: 2022-05-11

## 2022-05-10 RX ORDER — SODIUM CHLORIDE 0.9 % (FLUSH) 0.9 %
5-40 SYRINGE (ML) INJECTION EVERY 12 HOURS SCHEDULED
Status: DISCONTINUED | OUTPATIENT
Start: 2022-05-10 | End: 2022-05-10 | Stop reason: HOSPADM

## 2022-05-10 RX ORDER — HYDROCODONE BITARTRATE AND ACETAMINOPHEN 5; 325 MG/1; MG/1
2 TABLET ORAL EVERY 4 HOURS PRN
Status: DISCONTINUED | OUTPATIENT
Start: 2022-05-10 | End: 2022-05-11 | Stop reason: HOSPADM

## 2022-05-10 RX ORDER — ONDANSETRON 2 MG/ML
INJECTION INTRAMUSCULAR; INTRAVENOUS PRN
Status: DISCONTINUED | OUTPATIENT
Start: 2022-05-10 | End: 2022-05-10 | Stop reason: SDUPTHER

## 2022-05-10 RX ADMIN — ONDANSETRON 4 MG: 2 INJECTION INTRAMUSCULAR; INTRAVENOUS at 15:08

## 2022-05-10 RX ADMIN — KETOROLAC TROMETHAMINE 15 MG: 30 INJECTION, SOLUTION INTRAMUSCULAR at 16:10

## 2022-05-10 RX ADMIN — PROPOFOL 30 MG: 10 INJECTION, EMULSION INTRAVENOUS at 14:27

## 2022-05-10 RX ADMIN — SODIUM CHLORIDE, POTASSIUM CHLORIDE, SODIUM LACTATE AND CALCIUM CHLORIDE: 600; 310; 30; 20 INJECTION, SOLUTION INTRAVENOUS at 15:04

## 2022-05-10 RX ADMIN — KETAMINE HYDROCHLORIDE 50 MG: 50 INJECTION, SOLUTION INTRAMUSCULAR; INTRAVENOUS at 13:34

## 2022-05-10 RX ADMIN — HYDROMORPHONE HYDROCHLORIDE 0.5 MG: 2 INJECTION INTRAMUSCULAR; INTRAVENOUS; SUBCUTANEOUS at 14:38

## 2022-05-10 RX ADMIN — KETOROLAC TROMETHAMINE 15 MG: 30 INJECTION, SOLUTION INTRAMUSCULAR; INTRAVENOUS at 16:10

## 2022-05-10 RX ADMIN — CALCIUM GLUCONATE 2000 MG: 20 INJECTION, SOLUTION INTRAVENOUS at 20:44

## 2022-05-10 RX ADMIN — HYDROCODONE BITARTRATE AND ACETAMINOPHEN 2 TABLET: 5; 325 TABLET ORAL at 16:10

## 2022-05-10 RX ADMIN — SODIUM CHLORIDE: 9 INJECTION, SOLUTION INTRAVENOUS at 12:34

## 2022-05-10 RX ADMIN — LIDOCAINE HYDROCHLORIDE 60 MG: 20 INJECTION, SOLUTION INFILTRATION; PERINEURAL at 13:33

## 2022-05-10 RX ADMIN — FENTANYL CITRATE 50 MCG: 50 INJECTION, SOLUTION INTRAMUSCULAR; INTRAVENOUS at 13:33

## 2022-05-10 RX ADMIN — SODIUM CHLORIDE, PRESERVATIVE FREE 10 ML: 5 INJECTION INTRAVENOUS at 20:47

## 2022-05-10 RX ADMIN — HYDROMORPHONE HYDROCHLORIDE 1 MG: 2 INJECTION INTRAMUSCULAR; INTRAVENOUS; SUBCUTANEOUS at 14:27

## 2022-05-10 RX ADMIN — PROPOFOL 200 MG: 10 INJECTION, EMULSION INTRAVENOUS at 13:33

## 2022-05-10 RX ADMIN — ROCURONIUM BROMIDE 10 MG: 10 INJECTION INTRAVENOUS at 14:27

## 2022-05-10 RX ADMIN — FAMOTIDINE 20 MG: 20 TABLET ORAL at 20:45

## 2022-05-10 RX ADMIN — SCOPALAMINE 1 PATCH: 1 PATCH, EXTENDED RELEASE TRANSDERMAL at 13:19

## 2022-05-10 RX ADMIN — SUGAMMADEX 200 MG: 100 INJECTION, SOLUTION INTRAVENOUS at 15:31

## 2022-05-10 RX ADMIN — ROCURONIUM BROMIDE 50 MG: 10 INJECTION INTRAVENOUS at 13:34

## 2022-05-10 RX ADMIN — KETOROLAC TROMETHAMINE 15 MG: 30 INJECTION, SOLUTION INTRAMUSCULAR; INTRAVENOUS at 15:26

## 2022-05-10 RX ADMIN — ROCURONIUM BROMIDE 10 MG: 10 INJECTION INTRAVENOUS at 14:49

## 2022-05-10 RX ADMIN — Medication 3000 MG: at 13:39

## 2022-05-10 RX ADMIN — HYDROMORPHONE HYDROCHLORIDE 0.5 MG: 2 INJECTION INTRAMUSCULAR; INTRAVENOUS; SUBCUTANEOUS at 13:54

## 2022-05-10 RX ADMIN — DROPERIDOL 0.62 MG: 2.5 INJECTION, SOLUTION INTRAMUSCULAR; INTRAVENOUS at 16:25

## 2022-05-10 RX ADMIN — KETOROLAC TROMETHAMINE 15 MG: 30 INJECTION, SOLUTION INTRAMUSCULAR at 20:45

## 2022-05-10 RX ADMIN — SODIUM CHLORIDE: 9 INJECTION, SOLUTION INTRAVENOUS at 20:41

## 2022-05-10 RX ADMIN — LIDOCAINE HYDROCHLORIDE 40 MG: 20 INJECTION, SOLUTION INFILTRATION; PERINEURAL at 13:35

## 2022-05-10 RX ADMIN — MIDAZOLAM 2 MG: 1 INJECTION INTRAMUSCULAR; INTRAVENOUS at 13:23

## 2022-05-10 ASSESSMENT — PULMONARY FUNCTION TESTS
PIF_VALUE: 24
PIF_VALUE: 22
PIF_VALUE: 24
PIF_VALUE: 26
PIF_VALUE: 2
PIF_VALUE: 26
PIF_VALUE: 24
PIF_VALUE: 27
PIF_VALUE: 24
PIF_VALUE: 24
PIF_VALUE: 27
PIF_VALUE: 25
PIF_VALUE: 24
PIF_VALUE: 18
PIF_VALUE: 26
PIF_VALUE: 24
PIF_VALUE: 2
PIF_VALUE: 24
PIF_VALUE: 24
PIF_VALUE: 23
PIF_VALUE: 25
PIF_VALUE: 24
PIF_VALUE: 25
PIF_VALUE: 24
PIF_VALUE: 23
PIF_VALUE: 24
PIF_VALUE: 21
PIF_VALUE: 24
PIF_VALUE: 25
PIF_VALUE: 23
PIF_VALUE: 24
PIF_VALUE: 1
PIF_VALUE: 24
PIF_VALUE: 24
PIF_VALUE: 26
PIF_VALUE: 0
PIF_VALUE: 25
PIF_VALUE: 24
PIF_VALUE: 24
PIF_VALUE: 1
PIF_VALUE: 26
PIF_VALUE: 18
PIF_VALUE: 24
PIF_VALUE: 22
PIF_VALUE: 24
PIF_VALUE: 26
PIF_VALUE: 24
PIF_VALUE: 2
PIF_VALUE: 26
PIF_VALUE: 23
PIF_VALUE: 24
PIF_VALUE: 27
PIF_VALUE: 24
PIF_VALUE: 23
PIF_VALUE: 26
PIF_VALUE: 2
PIF_VALUE: 24
PIF_VALUE: 24
PIF_VALUE: 1
PIF_VALUE: 23
PIF_VALUE: 0
PIF_VALUE: 24
PIF_VALUE: 24
PIF_VALUE: 0
PIF_VALUE: 25
PIF_VALUE: 24
PIF_VALUE: 24
PIF_VALUE: 1
PIF_VALUE: 24
PIF_VALUE: 23
PIF_VALUE: 24
PIF_VALUE: 26
PIF_VALUE: 1
PIF_VALUE: 3
PIF_VALUE: 24
PIF_VALUE: 26
PIF_VALUE: 24
PIF_VALUE: 24
PIF_VALUE: 26
PIF_VALUE: 23
PIF_VALUE: 24
PIF_VALUE: 24
PIF_VALUE: 2
PIF_VALUE: 24
PIF_VALUE: 1
PIF_VALUE: 3
PIF_VALUE: 24
PIF_VALUE: 23
PIF_VALUE: 26
PIF_VALUE: 24
PIF_VALUE: 23
PIF_VALUE: 25
PIF_VALUE: 25
PIF_VALUE: 26
PIF_VALUE: 24
PIF_VALUE: 23
PIF_VALUE: 24
PIF_VALUE: 25
PIF_VALUE: 23
PIF_VALUE: 24
PIF_VALUE: 24
PIF_VALUE: 0
PIF_VALUE: 25
PIF_VALUE: 23
PIF_VALUE: 24
PIF_VALUE: 23
PIF_VALUE: 24
PIF_VALUE: 24
PIF_VALUE: 27
PIF_VALUE: 24

## 2022-05-10 ASSESSMENT — PAIN DESCRIPTION - LOCATION
LOCATION: NECK;THROAT
LOCATION: THROAT;NECK
LOCATION: NECK;THROAT

## 2022-05-10 ASSESSMENT — PAIN DESCRIPTION - DESCRIPTORS
DESCRIPTORS: ACHING
DESCRIPTORS: SORE
DESCRIPTORS: SORE

## 2022-05-10 ASSESSMENT — PAIN SCALES - GENERAL
PAINLEVEL_OUTOF10: 7
PAINLEVEL_OUTOF10: 2
PAINLEVEL_OUTOF10: 2
PAINLEVEL_OUTOF10: 4
PAINLEVEL_OUTOF10: 0
PAINLEVEL_OUTOF10: 2
PAINLEVEL_OUTOF10: 0
PAINLEVEL_OUTOF10: 2

## 2022-05-10 ASSESSMENT — PAIN DESCRIPTION - ORIENTATION
ORIENTATION: MID
ORIENTATION: MID

## 2022-05-10 ASSESSMENT — PAIN - FUNCTIONAL ASSESSMENT
PAIN_FUNCTIONAL_ASSESSMENT: ACTIVITIES ARE NOT PREVENTED
PAIN_FUNCTIONAL_ASSESSMENT: NONE - DENIES PAIN
PAIN_FUNCTIONAL_ASSESSMENT: ACTIVITIES ARE NOT PREVENTED

## 2022-05-10 ASSESSMENT — PAIN DESCRIPTION - FREQUENCY
FREQUENCY: CONTINUOUS
FREQUENCY: CONTINUOUS

## 2022-05-10 ASSESSMENT — PAIN DESCRIPTION - PAIN TYPE
TYPE: SURGICAL PAIN
TYPE: ACUTE PAIN

## 2022-05-10 ASSESSMENT — PAIN DESCRIPTION - ONSET
ONSET: ON-GOING
ONSET: ON-GOING

## 2022-05-10 NOTE — INTERVAL H&P NOTE
Update History & Physical    The patient's History and Physical was reviewed with the patient and I examined the patient. There was no change. The surgical site was confirmed by the patient and me. Plan: The risks, benefits, expected outcome, and alternative to the recommended procedure have been discussed with the patient. Patient understands and wants to proceed with the procedure. The patient was counseled at length about the risks of silvana Covid-19 during their perioperative period and any recovery window from their procedure. The patient was made aware that silvana Covid-19  may worsen their prognosis for recovering from their procedure  and lend to a higher morbidity and/or mortality risk. All material risks, benefits, and reasonable alternatives including postponing the procedure were discussed. The patient does wish to proceed with the procedure at this time.     Electronically signed by Aida Orlando MD on 5/10/2022 at 11:11 AM

## 2022-05-10 NOTE — ANESTHESIA PRE PROCEDURE
Department of Anesthesiology  Preprocedure Note       Name:  Erich Perez   Age:  31 y.o.  :  1985                                          MRN:  [id number]         Date:  5/10/2022      Surgeon: Fawn Roberto):  Manuel Espino MD    Procedure: Procedure(s):  TOTAL THYROIDECTOMY    Medications prior to admission:   Prior to Admission medications    Medication Sig Start Date End Date Taking? Authorizing Provider   fexofenadine (ALLEGRA ALLERGY) 180 MG tablet Take 180 mg by mouth daily   Yes Historical Provider, MD   levothyroxine (SYNTHROID) 50 MCG tablet Take 50 mcg by mouth Daily   Yes Historical Provider, MD   diphenhydrAMINE HCl (BENADRYL ALLERGY PO) Take by mouth as needed  Patient not taking: Reported on 5/10/2022    Historical Provider, MD   norethindrone-ethinyl estradiol (VYFEMLA) 0.4-35 MG-MCG per tablet Take by mouth See Admin Instructions  Patient not taking: Reported on 5/10/2022    Historical Provider, MD   hydrOXYzine (ATARAX) 10 MG tablet Take 10 mg by mouth 3 times daily as needed for Itching    Historical Provider, MD   famotidine (PEPCID) 20 MG tablet Take 1 tablet by mouth 2 times daily 21   Tonette Nageotte, DO       Current medications:    Current Facility-Administered Medications   Medication Dose Route Frequency Provider Last Rate Last Admin    ceFAZolin (ANCEF) 3000 mg in dextrose 5 % 100 mL IVPB  3,000 mg IntraVENous 30 Min Pre-Op Manuel Espino MD        0.9 % sodium chloride infusion   IntraVENous Continuous Crystal Camper,  mL/hr at  1234 New Bag at 05/10/22 1234       Allergies:     Allergies   Allergen Reactions    Prednisone Hives    Sulfa Antibiotics Rash       Problem List:    Patient Active Problem List   Diagnosis Code    Vaginal delivery O80       Past Medical History:        Diagnosis Date    Abnormal Pap smear of cervix     cervical polyp    Arthritis     Chronic idiopathic urticaria     sees Dr Mariela Wong in [place] Obesity     Thyroid nodule 02/2022       Past Surgical History:        Procedure Laterality Date    KNEE ARTHROSCOPY Left     US THYROID CYST ASPIRATION AND OR INJECTION  02/10/2022    US THYROID CYST ASPIRATION AND OR INJECTION 2/10/2022 Ailin Suh MD STR ULTRASOUND    WISDOM TOOTH EXTRACTION         Social History:    Social History     Tobacco Use    Smoking status: Never Smoker    Smokeless tobacco: Never Used   Substance Use Topics    Alcohol use: Not Currently     Comment: social                                Counseling given: Not Answered      Vital Signs (Current):   Vitals:    05/10/22 1144 05/10/22 1246   BP: 129/88    Pulse: 83    Resp: 16    Temp: 98 °F (36.7 °C)    TempSrc: Temporal    SpO2: 100%    Weight:  288 lb 8 oz (130.9 kg)   Height:  5' 5\" (1.651 m)                                              BP Readings from Last 3 Encounters:   05/10/22 129/88   02/16/22 128/60   11/18/21 114/79       NPO Status: Time of last liquid consumption: 0730 (sips with medication)                        Time of last solid consumption: 1930                        Date of last liquid consumption: 05/10/22                        Date of last solid food consumption: 05/09/22    BMI:   Wt Readings from Last 3 Encounters:   05/10/22 288 lb 8 oz (130.9 kg)   02/16/22 294 lb 6.4 oz (133.5 kg)   11/18/21 289 lb (131.1 kg)     Body mass index is 48.01 kg/m².     CBC:   Lab Results   Component Value Date    WBC 12.8 11/18/2021    RBC 4.80 11/18/2021    RBC 4.62 08/10/2020    HGB 12.5 05/09/2022    HCT 40.1 05/09/2022    MCV 85.2 11/18/2021    RDW 13.2 08/10/2020     11/18/2021       CMP:   Lab Results   Component Value Date     11/18/2021    K 3.3 11/18/2021     11/18/2021    CO2 24 11/18/2021    BUN 15 11/18/2021    CREATININE 0.6 11/18/2021    LABGLOM >90 11/18/2021    GLUCOSE 119 11/18/2021    PROT 6.9 11/18/2021    CALCIUM 9.0 11/18/2021    BILITOT <0.2 11/18/2021    ALKPHOS 64 11/18/2021    AST 11 11/18/2021 ALT 8 11/18/2021       POC Tests: No results for input(s): POCGLU, POCNA, POCK, POCCL, POCBUN, POCHEMO, POCHCT in the last 72 hours. Coags: No results found for: PROTIME, INR, APTT    HCG (If Applicable):   Lab Results   Component Value Date    PREGTESTUR negative 05/10/2022    PREGSERUM NEGATIVE 11/18/2021        ABGs: No results found for: PHART, PO2ART, CDR2VRT, SNB5RKD, BEART, I3QHJNJS     Type & Screen (If Applicable):  Lab Results   Component Value Date    LABABO A 08/10/2020    LABRH NEG 02/08/2021       Drug/Infectious Status (If Applicable):  No results found for: HIV, HEPCAB    COVID-19 Screening (If Applicable):   Lab Results   Component Value Date    COVID19 NOT  DETECTED 11/18/2021           Anesthesia Evaluation   no history of anesthetic complications:   Airway: Mallampati: II  TM distance: >3 FB   Neck ROM: full  Mouth opening: > = 3 FB Dental: normal exam         Pulmonary:normal exam        (-) COPD and asthma                           Cardiovascular:Negative CV ROS  Exercise tolerance: good (>4 METS),       (-) hypertension, past MI and CAD                Neuro/Psych:      (-) seizures and CVA           GI/Hepatic/Renal:   (+) morbid obesity     (-) GERD, liver disease and no renal disease       Endo/Other:    (+) hypothyroidism: arthritis:., .    (-) diabetes mellitus               Abdominal:   (+) obese,           Vascular:     - DVT. Other Findings:             Anesthesia Plan      general     ASA 3     (PIV. Additional access can be obtained after induction if needed. Standard ASA monitors. IV/PO opioids and other adjuncts as needed for pain control. PACU post op for recovery.     Possible anesthetics complications were discussed with the patient, including but not limited to: PONV, damage to the airway and surrounding structures (teeth, lips, gums, tongue, etc.), adverse reactions to medicine, cardiac complications (MI, CHF, arrhythmias, etc.), respiratory complications (post-op ventilation, respiratory failure, etc.), neurologic complications (nerve damage, stroke, seizure), and death. The patient was given the opportunity to ask questions and all questions were answered to the patient's satisfaction. The patient is in agreement with the anesthetic plan.  )  Induction: intravenous. Anesthetic plan and risks discussed with patient, spouse, father and mother. Plan discussed with CRNA.                   Rula Medley DO   5/10/2022

## 2022-05-10 NOTE — PLAN OF CARE
Problem: Discharge Planning  Goal: Discharge to home or other facility with appropriate resources  Outcome: Progressing     Problem: ABCDS Injury Assessment  Goal: Absence of physical injury  Outcome: Progressing     Problem: Pain  Goal: Verbalizes/displays adequate comfort level or baseline comfort level  Outcome: Progressing     Care plan reviewed with patient. Patient verbalizes understanding of the plan of care and contributes to goal setting.

## 2022-05-10 NOTE — BRIEF OP NOTE
Brief Postoperative Note      Patient: Dariana Brar  YOB: 1985  MRN: [id number]    Date of Procedure: 5/10/2022    Pre-Op Diagnosis:   1.  Multinodular goiter  2.  Left-sided thyroid nodule with atypia  3.  Hypothyroidism    Post-Op Diagnosis: Same       Procedure(s):  TOTAL THYROIDECTOMY    Surgeon(s):  Petra Isaacs MD    Assistant:  First Assistant: Cherri Wood RN    Anesthesia: General/local    Estimated Blood Loss (mL): 40 ml    Complications: None    Specimens:   ID Type Source Tests Collected by Time Destination   A : left lobe of thyroid  Tissue Thyroid SURGICAL PATHOLOGY Petra Isaacs MD 5/10/2022 1430    B : right lobe of thyroid Tissue Thyroid SURGICAL PATHOLOGY Petra Isaacs MD 5/10/2022 1507        Implants:  * No implants in log *      Drains: * No LDAs found *    Findings: as above - see op note for details    Electronically signed by Petra Isaacs MD on 5/10/2022 at 3:16 PM

## 2022-05-10 NOTE — PROGRESS NOTES
Pt admitted to  5K20 via cart/stretcher from OR. Complaints: None. IV normal saline infusing into the forearm left, condition patent and no redness at a rate of 100 mls/ hour. IV site free of s/s of infection or infiltration. Vital signs obtained. Assessment and data collection initiated. Two nurse skin assessment performed by Paulo Rios and Ace Montalvo. Oriented to room. Policies and procedures for 8AB explained. All questions answered with no further questions at this time. Fall prevention and safety brochure discussed with patient. Bed alarm on. Call light in reach. Oriented to room. J Carlos Rosario RN 5/10/2022 6:24 PM     Explained patients right to have family, representative or physician notified of their admission. Patient has Declined for physician to be notified. Patient has Declined for family/representative to be notified. Patient would like family notified once per shift?  No

## 2022-05-10 NOTE — PROGRESS NOTES
Pharmacist Review and Automatic Dose Adjustment of Prophylactic Enoxaparin      The reviewing pharmacist has made an adjustment to the ordered enoxaparin dose or converted to UFH per the approved [facility] protocol and table as identified below. Letha Gann is a 31 y.o. female. No results for input(s): CREATININE in the last 72 hours. CrCl cannot be calculated (Patient's most recent lab result is older than the maximum 30 days allowed. ).     Height:   Ht Readings from Last 1 Encounters:   05/10/22 5' 5\" (1.651 m)     Weight:  Wt Readings from Last 1 Encounters:   05/10/22 288 lb 8 oz (130.9 kg)               Plan: Based upon the patient's weight and renal function, the enoxaparin dose has been changed/converted to 30 mg BID    Will follow up on renal function/CBC tomorrow AM.        Thank you,  Terrance Caro, [facility]  5/10/2022, 4:29 PM

## 2022-05-10 NOTE — ANESTHESIA PROCEDURE NOTES
Airway  Date/Time: 5/10/2022 1:35 PM  Urgency: elective    Airway not difficult    General Information and Staff    Patient location during procedure: OR  Anesthesiologist: Sandra Kruse DO  Resident/CRNA: KAYLYN Doe CRNA  Performed: resident/CRNA     Indications and Patient Condition  Indications for airway management: anesthesia  Spontaneous Ventilation: absent  Sedation level: deep  Preoxygenated: yes  Patient position: sniffing  MILS maintained throughout  Mask difficulty assessment: vent by bag mask    Final Airway Details  Final airway type: endotracheal airway      Successful airway: ETT  Cuffed: yes   Successful intubation technique: video laryngoscopy  Facilitating devices/methods: intubating stylet  Endotracheal tube insertion site: oral  Blade: Kathleen  Blade size: #3  ETT size (mm): 7.0  Cormack-Lehane Classification: grade I - full view of glottis  Placement verified by: chest auscultation and capnometry   Inital cuff pressure (cm H2O): 8  Measured from: teeth  ETT to teeth (cm): 20  Number of attempts at approach: 1    no

## 2022-05-10 NOTE — PROGRESS NOTES
1539 Arouses to name on arrival to PACU with NC , HOB elevated , opt able to say the letter \"e\"  063 86 46 67 awake and oriented  States pain a # 3   1505 states pain a # 6 - 7 , Lab here  1510 medicated with Toradol 15 mg IV and Norco 2 tabs  1520 resting resp easy   1525 pt had a small emesis , medicated with droperidol     1540 pt states nausea easing up , pain  SDS called to relay message to family to got to pt room  1545 meets criteria for discharge , transported to [place] [id number] ,

## 2022-05-11 VITALS
SYSTOLIC BLOOD PRESSURE: 139 MMHG | OXYGEN SATURATION: 98 % | WEIGHT: 288.5 LBS | HEART RATE: 85 BPM | BODY MASS INDEX: 48.07 KG/M2 | RESPIRATION RATE: 18 BRPM | TEMPERATURE: 98.2 F | HEIGHT: 65 IN | DIASTOLIC BLOOD PRESSURE: 80 MMHG

## 2022-05-11 LAB
ANION GAP SERPL CALCULATED.3IONS-SCNC: 13 MEQ/L (ref 8–16)
BUN BLDV-MCNC: 10 MG/DL (ref 7–22)
CALCIUM IONIZED: 1.09 MMOL/L (ref 1.12–1.32)
CALCIUM SERPL-MCNC: 8.2 MG/DL (ref 8.5–10.5)
CHLORIDE BLD-SCNC: 103 MEQ/L (ref 98–111)
CO2: 22 MEQ/L (ref 23–33)
CREAT SERPL-MCNC: 0.7 MG/DL (ref 0.4–1.2)
GFR SERPL CREATININE-BSD FRML MDRD: > 90 ML/MIN/1.73M2
GLUCOSE BLD-MCNC: 100 MG/DL (ref 70–108)
HCT VFR BLD CALC: 34.2 % (ref 37–47)
HEMOGLOBIN: 10.7 GM/DL (ref 12–16)
POTASSIUM REFLEX MAGNESIUM: 4.1 MEQ/L (ref 3.5–5.2)
SODIUM BLD-SCNC: 138 MEQ/L (ref 135–145)

## 2022-05-11 PROCEDURE — 80048 BASIC METABOLIC PNL TOTAL CA: CPT

## 2022-05-11 PROCEDURE — 82330 ASSAY OF CALCIUM: CPT

## 2022-05-11 PROCEDURE — 99024 POSTOP FOLLOW-UP VISIT: CPT | Performed by: NURSE PRACTITIONER

## 2022-05-11 PROCEDURE — 6360000002 HC RX W HCPCS: Performed by: SURGERY

## 2022-05-11 PROCEDURE — 85018 HEMOGLOBIN: CPT

## 2022-05-11 PROCEDURE — 85014 HEMATOCRIT: CPT

## 2022-05-11 PROCEDURE — 6370000000 HC RX 637 (ALT 250 FOR IP): Performed by: SURGERY

## 2022-05-11 PROCEDURE — 36415 COLL VENOUS BLD VENIPUNCTURE: CPT

## 2022-05-11 RX ORDER — HYDROCODONE BITARTRATE AND ACETAMINOPHEN 5; 325 MG/1; MG/1
1 TABLET ORAL EVERY 6 HOURS PRN
Qty: 20 TABLET | Refills: 0 | Status: SHIPPED | OUTPATIENT
Start: 2022-05-11 | End: 2022-05-16

## 2022-05-11 RX ORDER — ONDANSETRON 4 MG/1
4 TABLET, ORALLY DISINTEGRATING ORAL EVERY 8 HOURS PRN
Qty: 10 TABLET | Refills: 0 | Status: SHIPPED | OUTPATIENT
Start: 2022-05-11 | End: 2022-06-07 | Stop reason: ALTCHOICE

## 2022-05-11 RX ORDER — KETOROLAC TROMETHAMINE 10 MG/1
10 TABLET, FILM COATED ORAL EVERY 6 HOURS PRN
Qty: 20 TABLET | Refills: 0 | Status: SHIPPED | OUTPATIENT
Start: 2022-05-11 | End: 2022-05-25 | Stop reason: ALTCHOICE

## 2022-05-11 RX ORDER — CALCIUM CARBONATE 200(500)MG
2 TABLET,CHEWABLE ORAL PRN
Qty: 60 TABLET | Refills: 0 | COMMUNITY
Start: 2022-05-11 | End: 2022-06-07

## 2022-05-11 RX ADMIN — KETOROLAC TROMETHAMINE 15 MG: 30 INJECTION, SOLUTION INTRAMUSCULAR at 04:25

## 2022-05-11 RX ADMIN — KETOROLAC TROMETHAMINE 15 MG: 30 INJECTION, SOLUTION INTRAMUSCULAR at 14:28

## 2022-05-11 RX ADMIN — FAMOTIDINE 20 MG: 20 TABLET ORAL at 08:11

## 2022-05-11 RX ADMIN — KETOROLAC TROMETHAMINE 15 MG: 30 INJECTION, SOLUTION INTRAMUSCULAR at 08:11

## 2022-05-11 RX ADMIN — LEVOTHYROXINE SODIUM 50 MCG: 0.05 TABLET ORAL at 06:44

## 2022-05-11 ASSESSMENT — PAIN SCALES - GENERAL
PAINLEVEL_OUTOF10: 5
PAINLEVEL_OUTOF10: 4
PAINLEVEL_OUTOF10: 5
PAINLEVEL_OUTOF10: 4

## 2022-05-11 ASSESSMENT — PAIN DESCRIPTION - PAIN TYPE
TYPE: ACUTE PAIN
TYPE: ACUTE PAIN

## 2022-05-11 ASSESSMENT — PAIN DESCRIPTION - FREQUENCY
FREQUENCY: CONTINUOUS
FREQUENCY: CONTINUOUS

## 2022-05-11 ASSESSMENT — PAIN DESCRIPTION - DESCRIPTORS
DESCRIPTORS: ACHING
DESCRIPTORS: ACHING

## 2022-05-11 ASSESSMENT — PAIN - FUNCTIONAL ASSESSMENT
PAIN_FUNCTIONAL_ASSESSMENT: ACTIVITIES ARE NOT PREVENTED
PAIN_FUNCTIONAL_ASSESSMENT: ACTIVITIES ARE NOT PREVENTED

## 2022-05-11 ASSESSMENT — PAIN DESCRIPTION - LOCATION
LOCATION: NECK
LOCATION: NECK

## 2022-05-11 ASSESSMENT — PAIN DESCRIPTION - ORIENTATION
ORIENTATION: MID
ORIENTATION: MID

## 2022-05-11 ASSESSMENT — PAIN DESCRIPTION - ONSET
ONSET: ON-GOING
ONSET: ON-GOING

## 2022-05-11 NOTE — PROGRESS NOTES
All discharge instructions given to patient and family with no further questions at this time. Patient educated on use of CHG soap and states she will use at home. Patient discharged off unit via wheelchair. Chart contents placed in yellow bin.

## 2022-05-11 NOTE — PROGRESS NOTES
[facility] - Dr. Lisa Najera  Postoperative Progress Note    Pt Name: Shanti Arambula  Medical Record Number: [id number]  Date of Birth 1985   Today's Date: 5/11/2022    ASSESSMENT   1. POD # 1 Status post total thyroidectomy  2. Multinodular goiter  3. Hypothyroidism  4. Left thyroid nodule with at least atypia  5. Morbid obesity (BMI 48)   has a past medical history of Abnormal Pap smear of cervix, Arthritis, Chronic idiopathic urticaria, Obesity, and Thyroid nodule. PLAN   1. Okay for soft diet  2. Pain control  3. Incisional care  4. Up with assistance  5. Ionized calciums reviewed. 1.09 this morning. Asymptomatic for any paresthesia. 6. Pathology pending  7. Continue home medications  8. Labs reviewed. hemoglobin 10.7 but no signs of bleeding. 9. PCP follow up. Discussed with Kerrie's office. She will be discharged on 50 mcg synthroid and he will adjust accordingly. Appointment in 1 week. 10. Okay with discharge after lunch if doing well. Follow up in 2 weeks. SUBJECTIVE   Patient was stable overnight. Chart reviewed. Updated by nursing staff. VS stable. No dysphagia. Minimal hoarseness. No paresthesia. Denies chest discomfort or dyspnea. No N/V; (+) belching, flatus. No BM. Tolerating ADULT DIET; Full Liquid diet. Pain controlled with analgesia. Up ad enoch. Anterior neck incision with steri-strips. No drainage. Tenderness as expected. CURRENT MEDICATIONS   Scheduled Meds:   famotidine  20 mg Oral BID    levothyroxine  50 mcg Oral Daily    sodium chloride flush  5-40 mL IntraVENous 2 times per day    enoxaparin  30 mg SubCUTAneous Q12H    ketorolac  15 mg IntraVENous Q6H    calcium replacement protocol   Other RX Placeholder     Continuous Infusions:   sodium chloride      sodium chloride 50 mL/hr at 05/11/22 0626     PRN Meds:. HYDROcodone 5 mg - acetaminophen **OR** HYDROcodone 5 mg - acetaminophen, sodium chloride flush, sodium chloride, ondansetron **OR** ondansetron, morphine **OR** morphine  OBJECTIVE   CURRENT VITALS:  height is 5' 5\" (1.651 m) and weight is 288 lb 8 oz (130.9 kg). Her oral temperature is 98.3 °F (36.8 °C). Her blood pressure is 105/68 and her pulse is 85. Her respiration is 18 and oxygen saturation is 95%. Temperature Range (24h):Temp: 98.3 °F (36.8 °C) Temp  Av °F (36.1 °C)  Min: 86.7 °F (30.4 °C)  Max: 98.3 °F (36.8 °C)  BP Range (98I): Systolic (84RXN), HXX:250 , Min:96 , YZH:019     Diastolic (28UQR), KQE:79, Min:51, Max:111    Pulse Range (24h): Pulse  Av.7  Min: 61  Max: 87  Respiration Range (24h): Resp  Av.6  Min: 0  Max: 26  Current Pulse Ox (24h):  SpO2: 95 %  Pulse Ox Range (24h):  SpO2  Av.6 %  Min: 93 %  Max: 100 %  Oxygen Amount and Delivery: O2 Flow Rate (L/min): 2 L/min  Incentive Spirometry Tx:            GENERAL: alert, no distress  LUNGS: clear to ausculation, without wheezes, rales or rhonchi  NECK: anterior neck incision without significant swelling/drainage. Incision well intact with steri-strips. Tender as expected. HEART: normal rate and regular rhythm  ABDOMEN: non-distended, soft, non-tender, bowel sounds present  INCISION: healing well, no significant drainage, no significant erythema  EXTREMITY: no cyanosis, clubbing or edema  In: .8 [I.V.:1852.8]  Out: 40   Date 22 0000 - 22 8593   Shift 6952-8780 2364-0932 3799-9448 24 Hour Total   INTAKE   I.V.(mL/kg/hr) 552.8   552.8   IV Piggyback 100   100   Shift Total(mL/kg) 652. 8(5)   652. 8(5)   OUTPUT   Shift Total(mL/kg)       Weight (kg) 130.9 130.9 130.9 130.9     LABS     Recent Labs     22  1408 22  0535   HGB 12.5 10.7*   HCT 40.1 34.2*   NA  --  138   K  --  4.1   CL  --  103   CO2  --  22*   BUN  --  10   CREATININE  --  0.7   CALCIUM  --  8.2*      Pathology pending   RADIOLOGY   No new imaging    Electronically signed by KAYLYN Thorne CNP on 2022 at 7:28 AM     Patient seen and examined independently by me earlier this AM. Above discussed and I agree with Ashia Sepulveda CNP. See my additional comments below for updated orders and plan. Labs, cultures, and radiographs where available were reviewed. I discussed patient concerns with the patient's nurse and instructions were given. Please see our orders for the updated patient care plan. -Advance diet as tolerated. Pain and nausea control. Incisional/wound care. Asymptomatic hypocalcemia. Synthroid. Ambulate. Clinically, looks good this morning. Most likely home later today.     Electronically signed by Mary Poole MD on 5/11/22 at 2:04 PM EDT

## 2022-05-11 NOTE — ANESTHESIA POSTPROCEDURE EVALUATION
Department of Anesthesiology  Postprocedure Note    Patient: Dionicio Jon  MRN: [id number]  YOB: 1985  Date of evaluation: 5/11/2022  Time:  9:17 AM     Procedure Summary     Date: 05/10/22 Room / Location: [address] [place] [address]    Anesthesia Start: 5958 Anesthesia Stop: 6683    Procedure: TOTAL THYROIDECTOMY (N/A Neck) Diagnosis: (MULTINODULAR GOITER)    Surgeons: Sivlestre Olosn MD Responsible Provider: Alessio Schwartz DO    Anesthesia Type: general ASA Status: 3          Anesthesia Type: No value filed. Cal Phase I: Cal Score: 8    Cal Phase II:      Last vitals: Reviewed and per EMR flowsheets.        Anesthesia Post Evaluation    Patient location during evaluation: bedside  Patient participation: complete - patient participated  Level of consciousness: awake and alert  Airway patency: patent  Nausea & Vomiting: no vomiting and no nausea  Complications: no  Cardiovascular status: hemodynamically stable  Respiratory status: acceptable  Hydration status: stable

## 2022-05-11 NOTE — PLAN OF CARE
Problem: Discharge Planning  Goal: Discharge to home or other facility with appropriate resources  5/11/2022 0318 by Lorena Cárdenas RN  Outcome: Progressing  Note: Patient is post-op and awaits Ca results, possible discharge in AM, to home with mother     Problem: ABCDS Injury Assessment  Goal: Absence of physical injury  5/11/2022 0318 by Lorena Cárdenas RN  Outcome: Progressing  Note: Call light within reach, patient instructed to call for help getting up, non skid socks, no falls     Problem: Pain  Goal: Verbalizes/displays adequate comfort level or baseline comfort level  5/11/2022 0318 by Lorena Cárdenas RN  Outcome: Progressing  Flowsheets (Taken 5/10/2022 2045)  Verbalizes/displays adequate comfort level or baseline comfort level:   Encourage patient to monitor pain and request assistance   Assess pain using appropriate pain scale   Administer analgesics based on type and severity of pain and evaluate response   Implement non-pharmacological measures as appropriate and evaluate response   Consider cultural and social influences on pain and pain management   Notify Licensed Independent Practitioner if interventions unsuccessful or patient reports new pain  Note: Pain assessed, patient rates 4/10, pain medication given, pain reassessed

## 2022-05-11 NOTE — OP NOTE
[address] [place] [address]                                OPERATIVE REPORT    PATIENT NAME: Shanel Mariee                       :        1985  MED REC NO:   [id number]                           ROOM:       0020  ACCOUNT NO:   [de-identified]                           ADMIT DATE: 05/10/2022  PROVIDER:     Kehinde Jackson M.D.    Rashmi Bautista OF PROCEDURE:  05/10/2022    PREOPERATIVE DIAGNOSES:  1.  Multinodular goiter. 2.  Hypothyroidism. 3.  Left thyroid nodule with at least atypia. POSTOPERATIVE DIAGNOSES:  1.  Multinodular goiter. 2.  Hypothyroidism. 3.  Left thyroid nodule with at least atypia. OPERATION PERFORMED:  Total thyroidectomy. SURGEON:  Kehinde Jackson MD    ASSISTANT:  Shaan Preciado. JOHN Perdomo    ANESTHESIA:  General/local.    ESTIMATED BLOOD LOSS:  40 mL. DRAINS:  None. COMPLICATIONS:  None. DISPOSITION:  Stable to the recovery room. INDICATIONS:  The patient is a 45-year-old female who I had seen in the  office secondary to enlarging multinodular goiter. One of the thyroid  nodules on the left side had at least atypia on FNA. Both operative and  nonoperative intervention plans were discussed. Risks of surgery were  further discussed. Some of the risks included but were not limited to  bleeding, infection, the need for reoperation, severe chronic  postoperative pain or numbness, major vascular or nerve injury,  cardiopulmonary complications, anesthetic complications, seroma/hematoma  formation, wound breakdown, severe calcium issues due to parathyroid  injury, recurrent laryngeal nerve damage, significant hoarseness or  aspiration, chronic pain, and death. After all of the questions were  answered in their entirety and the patient was completely aware of the  current situation, she elected to proceed with the procedure.     DESCRIPTION OF PROCEDURE:  After informed consent was signed and placed  on the chart, the patient was taken back to the operating room and  placed supine on the operating room table. General anesthesia was  induced. She tolerated this throughout the case. All pressure points  were padded. She was on preoperative antibiotics. Bilateral lower  extremity sequential compression devices were placed prior to incision. Her neck was slightly hyperextended. Her neck and upper chest were then  prepped and draped in the usual sterile standard fashion. A timeout  occurred prior to the operation which not only identified the patient,  but also the planned procedure to be performed. At the end of the  timeout, there were no questions or concerns. I began the operation first by making a symmetrical skin crease incision  few centimeters above the sternal notch. This was then deepened through  the subcutaneous tissues and the platysma with electrocautery. The  infraplatysmal flaps were then developed cephalad above the thyroid  cartilage lateral to the SCM muscle and then inferiorly towards the  sternal notch with electrocautery. The sternohyoid, sternothyroid, and  omohyoid muscles were then retracted laterally. I did not need to  definitively divide these. Kayla Galea was placed for better exposure. Thyroid was then better exposed and palpated. There was a large nodule  on the left side. The right side was fairly small and hard,  calcified-like nodules. I began the operation on the left side. This was displaced toward the  midline and the middle thyroid vein was identified. This was taken with  a harmonic. The lobe was then retracted more inferiorly and medially to  better expose the superior thyroid artery and vein. These were  skeletonized and then taken with the harmonic. Care was taken to stay  close to the gland so as to try and avoid the superior laryngeal nerves.   The lobe was then better retracted medially and what was felt to be the  recurrent laryngeal nerve was identified. This was protected throughout  the rest of the dissection. Inferior thyroid artery and vein were then  ligated and taken with the harmonic. What was felt to be the posterior  and inferior parathyroid glands were identified and gently dissected off  of the thyroidal tissue and spared. The posterior medial thyroid tissue  was then dissected from the trachea. Isthmus was then divided. Left  lobe was removed. This was then sent to Pathology for permanent. Operative field was then packed after hemostasis was assured. Attention was then directed to the right lobe which was then similarly  dissected out as to the left. Again mention this was much more smaller  in size and the harder, calcified-like lesions, but what was felt to be  the inferior and superior parathyroid glands were spared and then again,  I felt to identify the recurrent laryngeal nerve and this was spared  during the dissection. After hemostasis was assured and the right lobe  was sent to Pathology, we then irrigated with Irrisept. Hemostasis  appeared to be adequate. Fibrillar was then placed. Strap muscles were  reapproximated with interrupted 3-0 Vicryl suture. Platysma muscle was  reapproximated with interrupted 3-0 Vicryl suture. Skin was  reapproximated with a running 4-0 Vicryl in a subcuticular fashion. Closed incision was then clean, dry, and Steri-Strips applied. Dry  sterile dressings were applied. Sponge, needle, and instrumentation  count was correct at the end of the procedure. She tolerated the  procedure well and then was transferred to the postanesthesia care unit  in aur[place] condition.         Porsha Gallo M.D.    D: 05/10/2022 15:44:40       T: 05/10/2022 22:40:52     ADONAY/ALLEY_ROSALINE  Job#: [id number]     Doc#: [id number]    CC:

## 2022-05-12 ENCOUNTER — TELEPHONE (OUTPATIENT)
Dept: SURGERY | Age: 37
End: 2022-05-12

## 2022-05-12 NOTE — TELEPHONE ENCOUNTER
Patient called into office stating possible allergic reation to pain medication given at hospital. Patient states given IV Toradol after surgery and upon discharge she noticed hives on her buttocks and up her legs. Patient continued with pain meds at home post-op and states hives spreading  and also experiences some swelling around her eyes and cheeks. Patient instructed to stop medication and take OTC Tylenol for pain at this time. Patient states pain is well controlled and not having much pain but will stop the prescription meds and take Tylenol as needed. Patient denies any trouble swallowing, denies trouble breathing or SOB. Patient states she took OTC meds at home for the hives and swelling and seems to be improving with that. Instructed to call the office if no improvements or to the ER for any serious symptoms involving her airway. Patient verbalized understanding. Will call the office for any further needs.

## 2022-05-16 ENCOUNTER — TELEPHONE (OUTPATIENT)
Dept: SURGERY | Age: 37
End: 2022-05-16

## 2022-05-24 NOTE — PROGRESS NOTES
[address] [facility] [address]  Dept: [phone]  Dept Fax: [phone]  Loc: [phone]    Visit Date: 5/25/2022    Erich Perez is a 30 y.o. female who presents today for:  Chief Complaint   Patient presents with    Post-Op Check     s/p Total thyroidectomy-5/10/22     HPI:     [facility] is a 35-year old female patient who presents today for follow up status post total thyroidectomy on 5/10/22 secondary to nodules 2 weeks ago with Dr. Annalisa Vela. Pathology demonstrated lymphocytic thyroiditis. Has an appointment with endocrinology on 6/7. Patient taking her synthroid 50 mcg daily as previously prescribed. Saw PCP office with no med changes. She did have an allergic reaction to Toradol after she left the hospital so that is now listed as an allergy. Patient has rash and hives from this. Patient states she feels tired but slowly improving. Off all pain medication. Tylenol as needed. Still has some incisional soreness. No dysphagia with solid foods. Occasional has some difficulty swallowing water if she gulps. Some hoarseness and dry throat. Had an episode of paresthesia about a week ago in cheeks, fingertips, and toes. States she took a couple tums and it did improve. Tolerating regular diet without any nausea or vomiting. Denies any issues with constipation or diarrhea. Incision healing well without any signs of infection. No fevers or chills. Urinating without difficulties. Denies any shortness of breath or chest pain. Tolerating increased activity well.     Past Medical History:   Diagnosis Date    Abnormal Pap smear of cervix     cervical polyp    Arthritis     Chronic idiopathic urticaria     [facility] in [facility]    Obesity     Thyroid nodule 02/2022      Past Surgical History:   Procedure Laterality Date    KNEE ARTHROSCOPY Left     THYROIDECTOMY N/A 5/10/2022    TOTAL THYROIDECTOMY performed by Kurt Doshi MD at [address] AND OR INJECTION  02/10/2022     THYROID CYST ASPIRATION AND OR INJECTION 2/10/2022 Peter Hahn MD [facility] ULTRASOUND    WISDOM TOOTH EXTRACTION         Family History   Problem Relation Age of Onset    Other Mother         skin cancer    Cancer Mother         thyroid    Diabetes Mother     High Blood Pressure Mother     Cancer Father         prostate    Stroke Father     Diabetes Brother     Cancer Maternal Grandmother         possible thyroid maybe nodules       Social History     Tobacco Use    Smoking status: Never Smoker    Smokeless tobacco: Never Used   Substance Use Topics    Alcohol use: Not Currently     Comment: social      Current Outpatient Medications   Medication Sig Dispense Refill    ondansetron (ZOFRAN-ODT) 4 MG disintegrating tablet Take 1 tablet by mouth every 8 hours as needed for Nausea or Vomiting 10 tablet 0    calcium carbonate (ANTACID) 500 MG chewable tablet Take 2 tablets by mouth as needed (numbness/tingling to fingertips) 60 tablet 0    fexofenadine (ALLEGRA ALLERGY) 180 MG tablet Take 180 mg by mouth daily      levothyroxine (SYNTHROID) 50 MCG tablet Take 50 mcg by mouth Daily      diphenhydrAMINE HCl (BENADRYL ALLERGY PO) Take by mouth as needed       norethindrone-ethinyl estradiol (VYFEMLA) 0.4-35 MG-MCG per tablet Take by mouth See Admin Instructions       hydrOXYzine (ATARAX) 10 MG tablet Take 10 mg by mouth 3 times daily as needed for Itching      famotidine (PEPCID) 20 MG tablet Take 1 tablet by mouth 2 times daily 60 tablet 3     No current facility-administered medications for this visit. Allergies   Allergen Reactions    Prednisone Hives    Sulfa Antibiotics Rash    Toradol [Ketorolac Tromethamine] Hives and Rash       Subjective:     Review of Systems   Constitutional: Positive for fatigue.  Negative for activity change, appetite change, chills, diaphoresis, fever and unexpected weight change. HENT: Positive for voice change. Negative for congestion, dental problem, hearing loss, rhinorrhea, sinus pressure and sore throat. Hoarseness    Eyes: Negative for photophobia, pain, discharge, itching and visual disturbance. Respiratory: Negative for apnea, cough, choking, chest tightness, shortness of breath and wheezing. Cardiovascular: Negative for chest pain, palpitations and leg swelling. Gastrointestinal: Negative for abdominal distention, abdominal pain, anal bleeding, blood in stool, constipation, diarrhea, nausea and vomiting. Endocrine: Negative. Genitourinary: Negative for decreased urine volume, difficulty urinating, dysuria, frequency and urgency. Musculoskeletal: Negative for arthralgias, back pain, gait problem, joint swelling, myalgias and neck pain. Skin: Negative for color change, pallor, rash and wound. Allergic/Immunologic: Negative. Neurological: Negative for dizziness, tremors, weakness, numbness and headaches. Hematological: Negative. Psychiatric/Behavioral: Negative. Objective:   /84 (Site: Left Upper Arm, Position: Sitting, Cuff Size: Medium Adult)   Pulse 84   Temp 97.4 °F (36.3 °C) (Temporal)   Resp 18   Ht 5' 5\" (1.651 m)   Wt 295 lb 1.6 oz (133.9 kg)   SpO2 98%   BMI 49.11 kg/m²     Physical Exam  Vitals reviewed. Constitutional:       General: She is not in acute distress. Appearance: She is well-developed. She is not diaphoretic. HENT:      Head: Normocephalic and atraumatic. Right Ear: External ear normal.      Left Ear: External ear normal.      Nose: Nose normal.   Eyes:      General: No scleral icterus. Right eye: No discharge. Left eye: No discharge. Conjunctiva/sclera: Conjunctivae normal.   Neck:      Thyroid: No thyroid mass. Trachea: Trachea normal.        Comments: Hoarseness   Cardiovascular:      Rate and Rhythm: Normal rate and regular rhythm. Heart sounds: Normal heart sounds. Pulmonary:      Effort: Pulmonary effort is normal. No respiratory distress. Breath sounds: Normal breath sounds. No wheezing or rales. Chest:      Chest wall: No tenderness. Abdominal:      General: Bowel sounds are normal. There is no distension. Palpations: Abdomen is soft. There is no mass. Tenderness: There is no abdominal tenderness. There is no guarding or rebound. Musculoskeletal:         General: No tenderness. Normal range of motion. Cervical back: Normal range of motion and neck supple. No erythema. No spinous process tenderness or muscular tenderness. Normal range of motion. Lymphadenopathy:      Cervical: No cervical adenopathy. Skin:     General: Skin is warm and dry. Coloration: Skin is not pale. Findings: No erythema or rash. Neurological:      Mental Status: She is alert and oriented to person, place, and time. Cranial Nerves: No cranial nerve deficit. Psychiatric:         Behavior: Behavior normal.         Thought Content: Thought content normal.         Judgment: Judgment normal.       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     Lymphocytic thyroiditis demonstrates areas of fibrosis/fibrous bands.     One lymph node with no evidence of malignancy.  (0/1)     Benign appearing parathyroid.     Negative for malignancy.    B: Right lobe of thyroid, hemithyroidectomy:   Lymphocytic thyroiditis with background hypercellular dominant   nodules.     Fibrosis is present associated with the thyroiditis.     One parathyroid with no significant pathologic findings.     Two lymph nodes with no significant pathologic findings.  (0/2)     Negative for malignancy. Specimen:   A) EXCISION OF THYROID, LEFT LOBE   B) EXCISION OF THYROID, RIGHT LOBE       Gross Examination:   A - The container is labeled Kavitha Montemayor, left lobe, thyroid.  Received   in formalin is a thyroid lobe weighing 60 grams and measuring 7 x 4 x 3   cm.  The isthmus is not identified by the surgeon.  The capsular   surface is intact.  There are no parathyroids grossly identified.  The   surface is inked blue.  Sections through the specimen reveal a large   tan, focally hemorrhagic nodule measuring 4.5 x 3.7 x 3.5 cm.  There is   no discrete capsule.  There is no normal-appearing thyroid.  The   uninvolved thyroid has a tan nodular appearance.  Representative   sections are submitted.  Cassettes #1 through #13 - entire nodule; and   cassettes #14 and #15 - thyroid lobe.  ss.      B - The container is labeled Kavitha Montemayor, right lobe, thyroid.  Received   in formalin is an unoriented thyroid lobe.  The specimen weighs 18   grams and measures 5.5 x 3 x 1.8 cm.  The isthmus is not identified by   the surgeon.  The surface of the specimen is inked blue.  The capsular   surface is grossly intact.  There is a possible parathyroid measuring   0.7 cm.  Sections demonstrate a nodular-appearing cut surface.  There   is no discrete dominant nodule.  Representative sections are submitted.    Cassette #1 - shaved section near the region of the isthmus and   possible parathyroid; and cassettes #2 through #6 - representative   thyroid.  ss.  MARIE/JEFRYR:[id number]     Microscopic Examination:   A: Microscopic examination of the largest dominant nodule demonstrates   a nodule composed of varying sized follicles that range from small to medium sized.  Areas of stromal edema and hemorrhage are also seen. There is an ill-defined fibrous capsule surrounding the nodule. Definitive evidence of angioinvasion and full extension capsular   invasion is not seen.  The scant residual rim of additional background   thyroid parenchyma demonstrates a lymphocytic thyroiditis.  Overall,   the findings are consistent with a follicular adenoma set within a   background of lymphocytic thyroiditis.  Definitive evidence of   malignancy is not seen.  Cytologic features of papillary carcinoma are   not identified.  A benign appearing parathyroid and lymph node are also   present. Note: Selected slides (A1-A5 and A14) were reviewed in consultation   with Dr. Colletta Medico who concurs. B: Microscopic examination demonstrates sections of thyroid parenchyma   with lymphocytic thyroiditis (Hashimoto's thyroiditis).  The   thyroiditis shows areas of fibrosis with intersecting fibrous bands.  A   nodular appearing architecture is present.  In addition, a benign   parathyroid and lymph node are also seen (slide B1).  Hypercellular   dominant nodules are present, but definitive features of malignancy are   not seen. Patient Active Problem List   Diagnosis    Vaginal delivery    S/P total thyroidectomy     Assessment:     1. Status post total thyroidectomy  2. Morbid obesity (BMI 49)  3. Thyroid nodules - pathology demonstrated lymphocytic thyroiditis    Plan:     1. Continue synthroid dose for now. Check TSH, T4 prior to appointment with endocrinology on 6/7.   2. Continue to monitor hoarseness. Drink liquids often - preferably water. 3. Incision healing well without signs of infection. Continue wound care as directed. 4. Pathology reviewed with patient. Questions answered. 5. Appetite and bowel function returned   6. One episode of paraesthesia since surgery. Tums effective.  If happens again patient is to call our office and we will order calcium levels. 7. Toradol stopped due to reaction. Just using tylenol as needed for discomfort. 8. Increase lifting as tolerated   9. Follow up in 2 weeks. Signs and symptoms reviewed with patient that would be concerning and need her to return to office for re-evaluation. Patient states she will call if she has questions or concerns.   10. Follow up with PCP as directed       Electronically signed by KAYLYN Montelongo CNP on 5/26/2022 at 5:01 PM

## 2022-05-25 ENCOUNTER — OFFICE VISIT (OUTPATIENT)
Dept: SURGERY | Age: 37
End: 2022-05-25

## 2022-05-25 ENCOUNTER — TELEPHONE (OUTPATIENT)
Dept: SURGERY | Age: 37
End: 2022-05-25

## 2022-05-25 VITALS
TEMPERATURE: 97.4 F | SYSTOLIC BLOOD PRESSURE: 128 MMHG | HEIGHT: 65 IN | HEART RATE: 84 BPM | RESPIRATION RATE: 18 BRPM | WEIGHT: 293 LBS | DIASTOLIC BLOOD PRESSURE: 84 MMHG | OXYGEN SATURATION: 98 % | BODY MASS INDEX: 48.82 KG/M2

## 2022-05-25 DIAGNOSIS — E89.0 S/P TOTAL THYROIDECTOMY: Primary | ICD-10-CM

## 2022-05-25 PROCEDURE — 99024 POSTOP FOLLOW-UP VISIT: CPT | Performed by: NURSE PRACTITIONER

## 2022-05-25 NOTE — TELEPHONE ENCOUNTER
Patient s/p total thyroidectomy done on 5/10/2022 by Dr Johnny Durant on Levothyroxine 50mcg. She has a new patient appt with you on June 7th do you want Rinku Stone to order labs and adjust meds accordingly or do you want to wait until patient is seen in the office?

## 2022-05-26 ASSESSMENT — ENCOUNTER SYMPTOMS
SHORTNESS OF BREATH: 0
DIARRHEA: 0
COLOR CHANGE: 0
WHEEZING: 0
COUGH: 0
CONSTIPATION: 0
CHOKING: 0
SINUS PRESSURE: 0
BLOOD IN STOOL: 0
ANAL BLEEDING: 0
VOMITING: 0
ABDOMINAL DISTENTION: 0
EYE PAIN: 0
BACK PAIN: 0
VOICE CHANGE: 1
APNEA: 0
SORE THROAT: 0
ALLERGIC/IMMUNOLOGIC NEGATIVE: 1
EYE ITCHING: 0
NAUSEA: 0
EYE DISCHARGE: 0
CHEST TIGHTNESS: 0
RHINORRHEA: 0
ABDOMINAL PAIN: 0
PHOTOPHOBIA: 0

## 2022-05-26 NOTE — TELEPHONE ENCOUNTER
Spoke with  Selina Ortez she states Dr Peter Wallace will not adjust or order anything until seen in the office on June 7th

## 2022-05-26 NOTE — TELEPHONE ENCOUNTER
Francisco Camargo since he is seeing her so soon let's order TSH, Free T4 for her to have done prior to his appointment but keep her at the scheduled synthroid dose until then.

## 2022-06-06 ENCOUNTER — HOSPITAL ENCOUNTER (OUTPATIENT)
Age: 37
Discharge: HOME | End: 2022-06-06
Payer: COMMERCIAL

## 2022-06-06 DIAGNOSIS — E89.0 S/P TOTAL THYROIDECTOMY: ICD-10-CM

## 2022-06-06 LAB
T4 FREE: 0.53 NG/DL (ref 0.93–1.76)
TSH SERPL DL<=0.05 MIU/L-ACNC: 49.58 UIU/ML (ref 0.4–4.2)

## 2022-06-06 PROCEDURE — 84439 ASSAY OF FREE THYROXINE: CPT

## 2022-06-06 PROCEDURE — 36415 COLL VENOUS BLD VENIPUNCTURE: CPT

## 2022-06-06 PROCEDURE — 84443 ASSAY THYROID STIM HORMONE: CPT

## 2022-06-07 ENCOUNTER — OFFICE VISIT (OUTPATIENT)
Dept: SURGERY | Age: 37
End: 2022-06-07

## 2022-06-07 VITALS
SYSTOLIC BLOOD PRESSURE: 128 MMHG | OXYGEN SATURATION: 99 % | HEART RATE: 95 BPM | DIASTOLIC BLOOD PRESSURE: 80 MMHG | TEMPERATURE: 96.7 F | RESPIRATION RATE: 18 BRPM | HEIGHT: 65 IN | BODY MASS INDEX: 49.11 KG/M2

## 2022-06-07 DIAGNOSIS — E89.0 S/P TOTAL THYROIDECTOMY: Primary | ICD-10-CM

## 2022-06-07 PROCEDURE — 99024 POSTOP FOLLOW-UP VISIT: CPT | Performed by: NURSE PRACTITIONER

## 2022-06-11 ASSESSMENT — ENCOUNTER SYMPTOMS
EYE ITCHING: 0
VOICE CHANGE: 1
SORE THROAT: 0
CONSTIPATION: 0
ABDOMINAL DISTENTION: 0
COLOR CHANGE: 0
COUGH: 0
ALLERGIC/IMMUNOLOGIC NEGATIVE: 1
DIARRHEA: 0
WHEEZING: 0
CHEST TIGHTNESS: 0
CHOKING: 0
ABDOMINAL PAIN: 0
RHINORRHEA: 0
ANAL BLEEDING: 0
APNEA: 0
BLOOD IN STOOL: 0
EYE DISCHARGE: 0
PHOTOPHOBIA: 0
SHORTNESS OF BREATH: 0
VOMITING: 0
EYE PAIN: 0
BACK PAIN: 0
SINUS PRESSURE: 0
NAUSEA: 0

## 2022-06-11 NOTE — PROGRESS NOTES
[address] [facility] [address]  Dept: [phone]  Dept Fax: [phone]  Loc: [phone]    Visit Date: 6/7/2022    Letha Gann is a 30 y.o. female who presents today for:  Chief Complaint   Patient presents with    Post-Op Check      2 wk f/u-s/p Total thyroidectomy-5/10/22     HPI:     HPI    Moi Mclaughlin is a 35-year old female patient who presents today for follow up status post total thyroidectomy on 5/10/22 secondary to nodules 4 weeks ago with Dr. Shania Archibald. Pathology demonstrated lymphocytic thyroiditis. Has an appointment with endocrinology today. Patient taking her synthroid 50 mcg daily as previously prescribed. Will need adjustments today due to labs. TSH 49.580 and T4 0.53. Patient states she feels tired but about the same. Denies any incisional soreness. No dysphagia with solid foods. Still has some difficulty swallowing water if she gulps. Hoarseness and dry throat slightly improved. No paraesthesia. Tolerating regular diet without any nausea or vomiting. Denies any issues with constipation or diarrhea. Incision healing well without any signs of infection. No fevers or chills. Urinating without difficulties. Denies any shortness of breath or chest pain.      Past Medical History:   Diagnosis Date    Abnormal Pap smear of cervix     cervical polyp    Arthritis     Chronic idiopathic urticaria     [facility]    Obesity     Thyroid nodule 02/2022      Past Surgical History:   Procedure Laterality Date    KNEE ARTHROSCOPY Left     THYROIDECTOMY N/A 5/10/2022    TOTAL THYROIDECTOMY performed by Patricia Hernandez MD at [address] INJECTION  02/10/2022     THYROID CYST ASPIRATION AND OR INJECTION 2/10/2022 MD [facility] ULTRASOUND    WISDOM TOOTH EXTRACTION         Family History   Problem Relation Age of Onset    Other Mother skin cancer    Cancer Mother         thyroid    Diabetes Mother     High Blood Pressure Mother     Cancer Father         prostate    Stroke Father     Diabetes Brother     Cancer Maternal Grandmother         possible thyroid maybe nodules       Social History     Tobacco Use    Smoking status: Never Smoker    Smokeless tobacco: Never Used   Substance Use Topics    Alcohol use: Not Currently     Comment: social      Current Outpatient Medications   Medication Sig Dispense Refill    fexofenadine (ALLEGRA ALLERGY) 180 MG tablet Take 180 mg by mouth daily      norethindrone-ethinyl estradiol (VYFEMLA) 0.4-35 MG-MCG per tablet Take by mouth See Admin Instructions       hydrOXYzine (ATARAX) 10 MG tablet Take 10 mg by mouth 3 times daily as needed for Itching      levothyroxine (SYNTHROID) 175 MCG tablet Take 1 tablet by mouth Daily 30 tablet 3     No current facility-administered medications for this visit. Allergies   Allergen Reactions    Prednisone Hives    Sulfa Antibiotics Rash    Toradol [Ketorolac Tromethamine] Hives and Rash       Subjective:     Review of Systems   Constitutional: Positive for fatigue. Negative for activity change, appetite change, chills, diaphoresis, fever and unexpected weight change. HENT: Positive for voice change (hoarseness improving). Negative for congestion, dental problem, hearing loss, rhinorrhea, sinus pressure and sore throat. Hoarseness    Eyes: Negative for photophobia, pain, discharge, itching and visual disturbance. Respiratory: Negative for apnea, cough, choking, chest tightness, shortness of breath and wheezing. Cardiovascular: Negative for chest pain, palpitations and leg swelling. Gastrointestinal: Negative for abdominal distention, abdominal pain, anal bleeding, blood in stool, constipation, diarrhea, nausea and vomiting. Endocrine: Negative.     Genitourinary: Negative for decreased urine volume, difficulty urinating, dysuria, frequency and urgency. Musculoskeletal: Negative for arthralgias, back pain, gait problem, joint swelling, myalgias and neck pain. Skin: Negative for color change, pallor, rash and wound. Allergic/Immunologic: Negative. Neurological: Negative for dizziness, tremors, weakness, numbness and headaches. Hematological: Negative. Psychiatric/Behavioral: Negative. Objective:   /80 (Site: Right Upper Arm, Position: Sitting, Cuff Size: Medium Adult)   Pulse 95   Temp (!) 96.7 °F (35.9 °C) (Temporal)   Resp 18   Ht 5' 5\" (1.651 m)   SpO2 99%   BMI 49.11 kg/m²     Physical Exam  Vitals reviewed. Constitutional:       General: She is not in acute distress. Appearance: She is well-developed. She is not diaphoretic. HENT:      Head: Normocephalic and atraumatic. Right Ear: External ear normal.      Left Ear: External ear normal.      Nose: Nose normal.   Eyes:      General: No scleral icterus. Right eye: No discharge. Left eye: No discharge. Conjunctiva/sclera: Conjunctivae normal.   Neck:      Thyroid: No thyroid mass. Trachea: Trachea normal.        Comments: Hoarseness   Cardiovascular:      Rate and Rhythm: Normal rate and regular rhythm. Heart sounds: Normal heart sounds. Pulmonary:      Effort: Pulmonary effort is normal. No respiratory distress. Breath sounds: Normal breath sounds. No wheezing or rales. Chest:      Chest wall: No tenderness. Abdominal:      General: Bowel sounds are normal. There is no distension. Palpations: Abdomen is soft. There is no mass. Tenderness: There is no abdominal tenderness. There is no guarding or rebound. Musculoskeletal:         General: No tenderness. Normal range of motion. Cervical back: Normal range of motion and neck supple. No erythema. No spinous process tenderness or muscular tenderness. Normal range of motion. Lymphadenopathy:      Cervical: No cervical adenopathy.    Skin: General: Skin is warm and dry. Coloration: Skin is not pale. Findings: No erythema or rash. Neurological:      Mental Status: She is alert and oriented to person, place, and time. Cranial Nerves: No cranial nerve deficit. Psychiatric:         Behavior: Behavior normal.         Thought Content: Thought content normal.         Judgment: Judgment normal.          Lab Results   Component Value Date    TSH 49.580 (H) 06/06/2022     Patient Active Problem List   Diagnosis    Vaginal delivery    S/P total thyroidectomy     Assessment:     1. Status post total thyroidectomy   2. Postoperative hypothyroidism     Plan:     1. Incision healing well without issues  2. TSH and T4 reviewed. Patient has appointment with Dr. Juan Lovett today. Adjustments to be made at that appointment. Follow with endo as directed. 3. Hoarseness improving. Continue to monitor. 4. Denies any pain  5. No activity restrictions  6. Follow up as needed. Signs and symptoms reviewed with patient that would be concerning and need her to return to office for re-evaluation. Patient states she will call if she has questions or concerns.       Electronically signed by KAYLYN Myers CNP on 6/11/2022 at 10:37 AM

## 2022-07-08 ENCOUNTER — HOSPITAL ENCOUNTER (OUTPATIENT)
Age: 37
Discharge: HOME | End: 2022-07-08
Payer: COMMERCIAL

## 2022-07-08 DIAGNOSIS — E89.0 POSTOPERATIVE HYPOTHYROIDISM: ICD-10-CM

## 2022-07-08 LAB
T4 FREE: 1.73 NG/DL (ref 0.93–1.76)
TSH SERPL DL<=0.05 MIU/L-ACNC: 1.24 UIU/ML (ref 0.4–4.2)

## 2022-07-08 PROCEDURE — 84443 ASSAY THYROID STIM HORMONE: CPT

## 2022-07-08 PROCEDURE — 36415 COLL VENOUS BLD VENIPUNCTURE: CPT

## 2022-07-08 PROCEDURE — 84439 ASSAY OF FREE THYROXINE: CPT

## 2022-10-05 ENCOUNTER — HOSPITAL ENCOUNTER (OUTPATIENT)
Age: 37
Discharge: HOME | End: 2022-10-05
Payer: COMMERCIAL

## 2022-10-05 DIAGNOSIS — E89.0 POSTOPERATIVE HYPOTHYROIDISM: ICD-10-CM

## 2022-10-05 LAB
T4 FREE: 1.54 NG/DL (ref 0.93–1.76)
TSH SERPL DL<=0.05 MIU/L-ACNC: 0.29 UIU/ML (ref 0.4–4.2)

## 2022-10-05 PROCEDURE — 84443 ASSAY THYROID STIM HORMONE: CPT

## 2022-10-05 PROCEDURE — 36415 COLL VENOUS BLD VENIPUNCTURE: CPT

## 2022-10-05 PROCEDURE — 84439 ASSAY OF FREE THYROXINE: CPT

## 2022-12-27 ENCOUNTER — HOSPITAL ENCOUNTER (OUTPATIENT)
Dept: MRI IMAGING | Age: 37
Discharge: HOME | End: 2022-12-27
Payer: COMMERCIAL

## 2022-12-27 DIAGNOSIS — M25.562 LEFT KNEE PAIN, UNSPECIFIED CHRONICITY: ICD-10-CM

## 2022-12-27 PROCEDURE — 73721 MRI JNT OF LWR EXTRE W/O DYE: CPT

## 2023-05-03 ENCOUNTER — NURSE ONLY (OUTPATIENT)
Dept: LAB | Age: 38
End: 2023-05-03

## 2023-05-10 LAB — CYTOLOGY THIN PREP PAP: NORMAL

## 2023-08-16 ENCOUNTER — HOSPITAL ENCOUNTER (EMERGENCY)
Age: 38
Discharge: HOME | End: 2023-08-16
Payer: COMMERCIAL

## 2023-08-16 VITALS
RESPIRATION RATE: 16 BRPM | BODY MASS INDEX: 48.82 KG/M2 | SYSTOLIC BLOOD PRESSURE: 147 MMHG | WEIGHT: 293 LBS | OXYGEN SATURATION: 96 % | DIASTOLIC BLOOD PRESSURE: 80 MMHG | HEART RATE: 100 BPM | HEIGHT: 65 IN | TEMPERATURE: 98 F

## 2023-08-16 DIAGNOSIS — J02.0 STREPTOCOCCAL SORE THROAT: Primary | ICD-10-CM

## 2023-08-16 LAB — S PYO AG THROAT QL: POSITIVE

## 2023-08-16 PROCEDURE — 99213 OFFICE O/P EST LOW 20 MIN: CPT | Performed by: NURSE PRACTITIONER

## 2023-08-16 PROCEDURE — 99213 OFFICE O/P EST LOW 20 MIN: CPT

## 2023-08-16 PROCEDURE — 87651 STREP A DNA AMP PROBE: CPT

## 2023-08-16 RX ORDER — CEFDINIR 300 MG/1
300 CAPSULE ORAL 2 TIMES DAILY
Qty: 20 CAPSULE | Refills: 0 | Status: SHIPPED | OUTPATIENT
Start: 2023-08-16 | End: 2023-08-26

## 2023-08-16 ASSESSMENT — ENCOUNTER SYMPTOMS
SORE THROAT: 1
COUGH: 0
VOMITING: 0
SHORTNESS OF BREATH: 0
NAUSEA: 0

## 2023-08-16 ASSESSMENT — PAIN DESCRIPTION - PAIN TYPE: TYPE: ACUTE PAIN

## 2023-08-16 ASSESSMENT — PAIN - FUNCTIONAL ASSESSMENT
PAIN_FUNCTIONAL_ASSESSMENT: 0-10
PAIN_FUNCTIONAL_ASSESSMENT: ACTIVITIES ARE NOT PREVENTED

## 2023-08-16 ASSESSMENT — PAIN DESCRIPTION - FREQUENCY: FREQUENCY: CONTINUOUS

## 2023-08-16 ASSESSMENT — PAIN DESCRIPTION - DESCRIPTORS: DESCRIPTORS: ACHING

## 2023-08-16 ASSESSMENT — PAIN DESCRIPTION - LOCATION: LOCATION: THROAT

## 2023-08-16 ASSESSMENT — PAIN SCALES - GENERAL: PAINLEVEL_OUTOF10: 6

## 2023-08-16 NOTE — ED PROVIDER NOTES
[address]  Urgent Care Encounter       CHIEF COMPLAINT       Chief Complaint   Patient presents with    Sore Throat       Nurses Notes reviewed and I agree except as noted in the HPI. HISTORY OF PRESENT ILLNESS   Mattie Sanchez is a 41 y.o. female who presents for evaluation of sore throat, bilateral ear pain and subjective fevers at home. Patient denies any known sick exposures or any other upper respiratory symptoms. Patient states that she has been taking Tylenol and over-the-counter cough and cold medication without much relief at home. She states that she did have strep throat 2 months ago and completed a course of amoxicillin for the symptoms which did improve at that time. The history is provided by the patient. REVIEW OF SYSTEMS     Review of Systems   Constitutional:  Positive for chills and fever. HENT:  Positive for ear pain and sore throat. Negative for congestion and ear discharge. Respiratory:  Negative for cough and shortness of breath. Cardiovascular:  Negative for chest pain. Gastrointestinal:  Negative for nausea and vomiting. Musculoskeletal:  Negative for arthralgias and myalgias. Skin:  Negative for rash. Allergic/Immunologic: Negative for immunocompromised state. Neurological:  Negative for headaches. PAST MEDICAL HISTORY         Diagnosis Date    Abnormal Pap smear of cervix     cervical polyp    Arthritis     Chronic idiopathic urticaria     sees Dr Irma Jessica in [place]    Obesity     Thyroid nodule 02/2022       SURGICALHISTORY     Patient  has a past surgical history that includes Knee arthroscopy (Left); [place] tooth extraction; US ASP/INJ THYROID CYST (02/10/2022); and Thyroidectomy (N/A, 5/10/2022).     CURRENT MEDICATIONS       Previous Medications    ADALIMUMAB (HUMIRA) 40 MG/0.4ML PSKT    Inject 40 mg into the skin every 14 days    FEXOFENADINE (ALLEGRA ALLERGY) 180 MG TABLET    Take 180 mg by mouth daily    HYDROXYZINE (ATARAX)

## 2024-01-11 DIAGNOSIS — E89.0 POSTOPERATIVE HYPOTHYROIDISM: ICD-10-CM

## 2024-01-11 LAB
T4 FREE: 1.41
TSH SERPL DL<=0.05 MIU/L-ACNC: 2.01 UIU/ML

## 2024-01-17 NOTE — FLOWSHEET NOTE
Reviewed discharge instructions with patient she voiced understanding. Patient/Caregiver provided printed discharge information.

## 2024-01-18 ENCOUNTER — OFFICE VISIT (OUTPATIENT)
Age: 39
End: 2024-01-18
Payer: COMMERCIAL

## 2024-01-18 VITALS
WEIGHT: 293 LBS | HEART RATE: 78 BPM | DIASTOLIC BLOOD PRESSURE: 82 MMHG | SYSTOLIC BLOOD PRESSURE: 118 MMHG | BODY MASS INDEX: 48.82 KG/M2 | HEIGHT: 65 IN

## 2024-01-18 DIAGNOSIS — E89.0 POSTOPERATIVE HYPOTHYROIDISM: Primary | ICD-10-CM

## 2024-01-18 PROCEDURE — 99213 OFFICE O/P EST LOW 20 MIN: CPT | Performed by: INTERNAL MEDICINE

## 2024-01-18 PROCEDURE — G8417 CALC BMI ABV UP PARAM F/U: HCPCS | Performed by: INTERNAL MEDICINE

## 2024-01-18 PROCEDURE — G8427 DOCREV CUR MEDS BY ELIG CLIN: HCPCS | Performed by: INTERNAL MEDICINE

## 2024-01-18 PROCEDURE — 1036F TOBACCO NON-USER: CPT | Performed by: INTERNAL MEDICINE

## 2024-01-18 PROCEDURE — G8484 FLU IMMUNIZE NO ADMIN: HCPCS | Performed by: INTERNAL MEDICINE

## 2024-01-18 NOTE — PROGRESS NOTES
[facility] [place] [facility]  [facility] ENDOCRINOLOGY  [address]  [facility] [address]  Dept: [phone]  Loc: [phone]     Visit Date: 1/18/2024    Shandra Camilo is a 38 y.o. female who presents today for:  No chief complaint on file.           Subjective:      HPI     Shandra Camilo is a 38 y.o. , female who comes for f/u  for hypothyroidism.  Referring provider:   Joshua Sy MD       This patient was diagnosed with hypothyroidism last year.   Etiology of hypothyroidism is Surgery.  The pathology was benign.  Current therapy includes 175mcg of synthroid daily for 6 days and none on the seventh day.  She has been on this dose since the last visit.  She had blood work on 1/11/2024 which showed normal free T4 and TSH.  In terms of symptoms she has been having some cold intolerance, fatigue, memory problems and weight gain.  The patient is having problems with her knees.  Past Medical History:   Diagnosis Date    Abnormal Pap smear of cervix     cervical polyp    Arthritis     Chronic idiopathic urticaria     sees Dr Leyva in [place]    Obesity     Thyroid nodule 02/2022      Past Surgical History:   Procedure Laterality Date    KNEE ARTHROSCOPY Left     THYROIDECTOMY N/A 5/10/2022    TOTAL THYROIDECTOMY performed by Julio Cesar Najera MD at [facility] OR    US THYROID CYST ASPIRATION AND OR INJECTION  02/10/2022     THYROID CYST ASPIRATION AND OR INJECTION 2/10/2022 Venancio Do MD [facility] ULTRASOUND    WISDOM TOOTH EXTRACTION         Family History   Problem Relation Age of Onset    Other Mother         skin cancer    Cancer Mother         thyroid    Diabetes Mother     High Blood Pressure Mother     Cancer Father         prostate    Stroke Father     Diabetes Brother     Cancer Maternal Grandmother         possible thyroid maybe nodules     Social History     Tobacco Use    Smoking status: Never    Smokeless tobacco: Never   Substance Use Topics    Alcohol use: Not

## 2024-02-08 ENCOUNTER — HOSPITAL ENCOUNTER (OUTPATIENT)
Dept: MRI IMAGING | Age: 39
Discharge: HOME | End: 2024-02-08
Payer: COMMERCIAL

## 2024-02-08 DIAGNOSIS — M25.522 PAIN IN LEFT ELBOW: ICD-10-CM

## 2024-02-08 PROCEDURE — 73221 MRI JOINT UPR EXTREM W/O DYE: CPT

## 2024-02-10 ENCOUNTER — HOSPITAL ENCOUNTER (EMERGENCY)
Age: 39
Discharge: HOME | End: 2024-02-10
Payer: COMMERCIAL

## 2024-02-10 VITALS
RESPIRATION RATE: 18 BRPM | TEMPERATURE: 99.5 F | HEIGHT: 65 IN | BODY MASS INDEX: 48.82 KG/M2 | WEIGHT: 293 LBS | OXYGEN SATURATION: 99 % | DIASTOLIC BLOOD PRESSURE: 98 MMHG | SYSTOLIC BLOOD PRESSURE: 149 MMHG | HEART RATE: 100 BPM

## 2024-02-10 DIAGNOSIS — R03.0 ELEVATED BLOOD PRESSURE READING IN OFFICE WITHOUT DIAGNOSIS OF HYPERTENSION: ICD-10-CM

## 2024-02-10 DIAGNOSIS — J10.1 INFLUENZA A: Primary | ICD-10-CM

## 2024-02-10 LAB
FLUAV AG SPEC QL: POSITIVE
FLUBV AG SPEC QL: NEGATIVE

## 2024-02-10 PROCEDURE — 87804 INFLUENZA ASSAY W/OPTIC: CPT

## 2024-02-10 PROCEDURE — 99213 OFFICE O/P EST LOW 20 MIN: CPT

## 2024-02-10 PROCEDURE — 99214 OFFICE O/P EST MOD 30 MIN: CPT

## 2024-02-10 RX ORDER — BENZONATATE 200 MG/1
200 CAPSULE ORAL 3 TIMES DAILY PRN
Qty: 21 CAPSULE | Refills: 0 | Status: SHIPPED | OUTPATIENT
Start: 2024-02-10 | End: 2024-02-17

## 2024-02-10 RX ORDER — ALBUTEROL SULFATE 90 UG/1
2 AEROSOL, METERED RESPIRATORY (INHALATION) EVERY 6 HOURS PRN
Qty: 18 G | Refills: 3 | Status: SHIPPED | OUTPATIENT
Start: 2024-02-10

## 2024-02-10 RX ORDER — PREDNISONE 5 MG/1
20 TABLET ORAL 2 TIMES DAILY
Qty: 80 TABLET | Refills: 0 | Status: SHIPPED | OUTPATIENT
Start: 2024-02-10 | End: 2024-02-20

## 2024-02-10 ASSESSMENT — ENCOUNTER SYMPTOMS
COUGH: 1
RHINORRHEA: 1
CHEST TIGHTNESS: 1
SHORTNESS OF BREATH: 0
NAUSEA: 0
SORE THROAT: 0
DIARRHEA: 0
VOMITING: 0

## 2024-02-10 ASSESSMENT — PAIN - FUNCTIONAL ASSESSMENT: PAIN_FUNCTIONAL_ASSESSMENT: NONE - DENIES PAIN

## 2024-02-10 NOTE — ED TRIAGE NOTES
Pt to [facility] ambulatory with a cough, chest congestion, runny nose, and fever.  This started on Thursday.

## 2024-02-10 NOTE — ED PROVIDER NOTES
[facility] URGENT CARE  Urgent Care Encounter       CHIEF COMPLAINT       Chief Complaint   Patient presents with    Cough    Chest Congestion    URI    Fever       Nurses Notes reviewed and I agree except as noted in the HPI.  HISTORY OF PRESENT ILLNESS   Shandra Camilo is a 38 y.o. female who presents with concerns of cough, congestion, and fever that has been ongoing since Wednesday evening / Thursday morning. Reports using otc cold and flu medication as well as Tylenol and Ibuprofen for pain and fever management.      HPI    REVIEW OF SYSTEMS     Review of Systems   Constitutional:  Positive for chills and fever. Negative for activity change, appetite change and fatigue.   HENT:  Positive for congestion and rhinorrhea. Negative for sore throat.    Respiratory:  Positive for cough (non productive, dry) and chest tightness (with coughing). Negative for shortness of breath.    Gastrointestinal:  Negative for diarrhea, nausea and vomiting.   All other systems reviewed and are negative.      PAST MEDICAL HISTORY         Diagnosis Date    Abnormal Pap smear of cervix     cervical polyp    Arthritis     Chronic idiopathic urticaria     sees Dr Leyva in [place]    Obesity     Thyroid nodule 02/2022       SURGICALHISTORY     Patient  has a past surgical history that includes Knee arthroscopy (Left); [place] tooth extraction; US ASP/INJ THYROID CYST (02/10/2022); and Thyroidectomy (N/A, 5/10/2022).    CURRENT MEDICATIONS       Previous Medications    ADALIMUMAB (HUMIRA) 40 MG/0.4ML PSKT    Inject 40 mg into the skin every 14 days    FEXOFENADINE (ALLEGRA ALLERGY) 180 MG TABLET    Take 1 tablet by mouth daily    HUMIRA PEN 80 MG/0.8ML PNKT        HYDROXYZINE (ATARAX) 10 MG TABLET    Take 10 mg by mouth 3 times daily as needed for Itching    LEVOTHYROXINE (SYNTHROID) 175 MCG TABLET    Take 1 tablet daily for 6 days and 1/2 tablet on the 7th day       ALLERGIES     Patient is is allergic to prednisone, sulfa  Mucinex D.  Instructed use over-the-counter Tylenol and Motrin for pain or fever.  Instructed to push oral fluids.  Instructed to isolate until symptoms improve along with resolution of fever for 24 hours without medications.  Instructed to complete good hand hygiene.  Instructed to follow-up with PCP in 3 to 5 days with new worsening symptoms.  Instructed to present to the emergent department for severe dyspnea, fever uncontrolled with acetaminophen, or other symptoms deemed emergent.  Patient is agreeable with the above plan and denies questions or concerns at this time.    The patient has been informed that they may have pre-hypertension or hypertension based on a blood pressure reading in the urgent care department.  The patient was advised to monitor their pressure daily at possible.  They're also recommended to stop smoking day her tobacco dependency, decrease caffeine, lose weight, increase in exercise, take the medicine on a daily basis and they're taking blood pressure medication. I recommend that the patient call the primary care provider listed on their discharge instructions or a physician of their choice this week to arrange follow up for further evaluation of possible pre-hypertension or hypertension. If they develop any chest pain, shortness of breath, or any diaphoresis they're to dial 911 or go to the emergency department for further evaluation.  They are agreeable to the treatment plan and left ambulatory without any problems.        PATIENT REFERRED TO:  Joshua Sy MD  [address] / [place] [address]      DISCHARGE MEDICATIONS:  New Prescriptions    ALBUTEROL SULFATE HFA (PROVENTIL;VENTOLIN;PROAIR) 108 (90 BASE) MCG/ACT INHALER    Inhale 2 puffs into the lungs every 6 hours as needed for Wheezing    BENZONATATE (TESSALON) 200 MG CAPSULE    Take 1 capsule by mouth 3 times daily as needed for Cough       Discontinued Medications    NORETHINDRONE-ETHINYL ESTRADIOL (VYFEMLA) 0.4-35

## 2024-02-11 NOTE — DISCHARGE INSTRUCTIONS
Medications and inhaler as prescribed.   Zyrtec, Flonase, Mucinex for congestion.   Increase water intake, frequent hand washing.  Tylenol / Ibuprofen as needed for fever and or pain.  Follow up with PCP in 3-5 days if no improvement or sooner with worsening symptoms.

## 2024-04-13 LAB
T4 FREE: 1.38
TSH SERPL DL<=0.05 MIU/L-ACNC: 2.67 UIU/ML

## 2024-04-18 ENCOUNTER — OFFICE VISIT (OUTPATIENT)
Age: 39
End: 2024-04-18
Payer: COMMERCIAL

## 2024-04-18 VITALS
WEIGHT: 293 LBS | HEIGHT: 65 IN | HEART RATE: 99 BPM | RESPIRATION RATE: 18 BRPM | DIASTOLIC BLOOD PRESSURE: 84 MMHG | BODY MASS INDEX: 48.82 KG/M2 | SYSTOLIC BLOOD PRESSURE: 136 MMHG

## 2024-04-18 DIAGNOSIS — E89.0 POSTOPERATIVE HYPOTHYROIDISM: Primary | ICD-10-CM

## 2024-04-18 PROCEDURE — G8427 DOCREV CUR MEDS BY ELIG CLIN: HCPCS | Performed by: INTERNAL MEDICINE

## 2024-04-18 PROCEDURE — 1036F TOBACCO NON-USER: CPT | Performed by: INTERNAL MEDICINE

## 2024-04-18 PROCEDURE — 99213 OFFICE O/P EST LOW 20 MIN: CPT | Performed by: INTERNAL MEDICINE

## 2024-04-18 PROCEDURE — G8417 CALC BMI ABV UP PARAM F/U: HCPCS | Performed by: INTERNAL MEDICINE

## 2024-04-18 RX ORDER — LEVOTHYROXINE SODIUM 175 UG/1
TABLET ORAL
Qty: 30 TABLET | Refills: 5 | Status: SHIPPED | OUTPATIENT
Start: 2024-04-18

## 2024-04-18 NOTE — PROGRESS NOTES
[facility] [place] [facility]  [facility] ENDOCRINOLOGY  [address]  [facility] [address]  Dept: [phone]  Loc: [phone]     Visit Date: 4/18/2024    Shandra Camilo is a 38 y.o. female who presents today for:  Chief Complaint   Patient presents with    Follow-up     Postoperative hypothyroidism               Subjective:      HPI     Shandra Camilo is a 38 y.o. , female who comes for f/u  for hypothyroidism.  Referring provider:   Joshua Sy MD       This patient was diagnosed with hypothyroidism 2 years ago.   Etiology of hypothyroidism is Surgery.  The pathology was benign.  Current therapy includes 175mcg of synthroid daily for 6 days and none on the seventh day.  Today she reports cold intolerance, dry skin, fatigue and memory problems.  However her labs on 4/13/2024 came back normal.  Past Medical History:   Diagnosis Date    Abnormal Pap smear of cervix     cervical polyp    Arthritis     Chronic idiopathic urticaria     sees Dr Leyva in [place]    Obesity     Thyroid nodule 02/2022      Past Surgical History:   Procedure Laterality Date    KNEE ARTHROSCOPY Left     THYROIDECTOMY N/A 5/10/2022    TOTAL THYROIDECTOMY performed by Julio Cesar Najera MD at [facility] OR    US THYROID CYST ASPIRATION AND OR INJECTION  02/10/2022    US THYROID CYST ASPIRATION AND OR INJECTION 2/10/2022 Venancio Do MD [facility] ULTRASOUND    WISDOM TOOTH EXTRACTION         Family History   Problem Relation Age of Onset    Other Mother         skin cancer    Cancer Mother         thyroid    Diabetes Mother     High Blood Pressure Mother     Cancer Father         prostate    Stroke Father     Diabetes Brother     Cancer Maternal Grandmother         possible thyroid maybe nodules     Social History     Tobacco Use    Smoking status: Never     Passive exposure: Never    Smokeless tobacco: Never   Substance Use Topics    Alcohol use: Not Currently     Comment: social      Current Outpatient

## 2024-05-20 ENCOUNTER — NURSE ONLY (OUTPATIENT)
Dept: LAB | Age: 39
End: 2024-05-20

## 2024-05-22 ENCOUNTER — HOSPITAL ENCOUNTER (OUTPATIENT)
Age: 39
Discharge: HOME | End: 2024-05-22
Payer: COMMERCIAL

## 2024-05-22 LAB — PROGEST SERPL-MCNC: 12.67 NG/ML

## 2024-05-22 PROCEDURE — 36415 COLL VENOUS BLD VENIPUNCTURE: CPT

## 2024-05-22 PROCEDURE — 84144 ASSAY OF PROGESTERONE: CPT

## 2024-06-03 LAB — CYTOLOGY THIN PREP PAP: NORMAL

## 2024-06-20 ENCOUNTER — HOSPITAL ENCOUNTER (OUTPATIENT)
Age: 39
Discharge: HOME | End: 2024-06-20
Payer: COMMERCIAL

## 2024-06-20 LAB — PROGEST SERPL-MCNC: 10.78 NG/ML

## 2024-06-20 PROCEDURE — 36415 COLL VENOUS BLD VENIPUNCTURE: CPT

## 2024-06-20 PROCEDURE — 84144 ASSAY OF PROGESTERONE: CPT

## 2024-10-11 ENCOUNTER — HOSPITAL ENCOUNTER (OUTPATIENT)
Age: 39
Discharge: HOME | End: 2024-10-11
Payer: COMMERCIAL

## 2024-10-11 DIAGNOSIS — E89.0 POSTOPERATIVE HYPOTHYROIDISM: ICD-10-CM

## 2024-10-11 LAB
T4 FREE SERPL-MCNC: 1.2 NG/DL (ref 0.93–1.68)
TSH SERPL DL<=0.005 MIU/L-ACNC: 7.03 UIU/ML (ref 0.4–4.2)

## 2024-10-11 PROCEDURE — 36415 COLL VENOUS BLD VENIPUNCTURE: CPT

## 2024-10-11 PROCEDURE — 84439 ASSAY OF FREE THYROXINE: CPT

## 2024-10-11 PROCEDURE — 84443 ASSAY THYROID STIM HORMONE: CPT

## 2024-10-15 ENCOUNTER — TELEPHONE (OUTPATIENT)
Age: 39
End: 2024-10-15

## 2024-10-15 NOTE — TELEPHONE ENCOUNTER
----- Message from Dr. Lionel Zimmer MD sent at 10/12/2024 12:44 PM EDT -----  Abnormal thyroid labs.  Keep appointment

## 2024-10-18 ENCOUNTER — OFFICE VISIT (OUTPATIENT)
Age: 39
End: 2024-10-18
Payer: COMMERCIAL

## 2024-10-18 VITALS
DIASTOLIC BLOOD PRESSURE: 82 MMHG | HEIGHT: 65 IN | WEIGHT: 293 LBS | HEART RATE: 82 BPM | BODY MASS INDEX: 48.82 KG/M2 | SYSTOLIC BLOOD PRESSURE: 126 MMHG

## 2024-10-18 DIAGNOSIS — E89.0 POSTOPERATIVE HYPOTHYROIDISM: Primary | ICD-10-CM

## 2024-10-18 PROCEDURE — G8417 CALC BMI ABV UP PARAM F/U: HCPCS | Performed by: INTERNAL MEDICINE

## 2024-10-18 PROCEDURE — 1036F TOBACCO NON-USER: CPT | Performed by: INTERNAL MEDICINE

## 2024-10-18 PROCEDURE — G8484 FLU IMMUNIZE NO ADMIN: HCPCS | Performed by: INTERNAL MEDICINE

## 2024-10-18 PROCEDURE — G8427 DOCREV CUR MEDS BY ELIG CLIN: HCPCS | Performed by: INTERNAL MEDICINE

## 2024-10-18 PROCEDURE — 99214 OFFICE O/P EST MOD 30 MIN: CPT | Performed by: INTERNAL MEDICINE

## 2024-10-18 RX ORDER — LEVOTHYROXINE SODIUM 175 UG/1
TABLET ORAL
Qty: 30 TABLET | Refills: 5 | Status: SHIPPED | OUTPATIENT
Start: 2024-10-18

## 2024-10-18 NOTE — PROGRESS NOTES
Dispense Refill    HUMIRA PEN 80 MG/0.8ML PNKT       Adalimumab (HUMIRA) 40 MG/0.4ML PSKT Inject 40 mg into the skin every 14 days      fexofenadine (ALLEGRA ALLERGY) 180 MG tablet Take 1 tablet by mouth daily      hydrOXYzine (ATARAX) 10 MG tablet Take 1 tablet by mouth 3 times daily as needed for Itching PRN      levothyroxine (SYNTHROID) 175 MCG tablet Take 1 tablet daily for 6 days and 1/2 tablet on the 7th day 30 tablet 5    albuterol sulfate HFA (PROVENTIL;VENTOLIN;PROAIR) 108 (90 Base) MCG/ACT inhaler Inhale 2 puffs into the lungs every 6 hours as needed for Wheezing (Patient not taking: Reported on 4/18/2024) 18 g 3     No current facility-administered medications for this visit.     Allergies   Allergen Reactions    Hydrocodone-Acetaminophen Anaphylaxis, Hives, Itching and Rash    Prednisone Hives    Sulfa Antibiotics Rash    Toradol [Ketorolac Tromethamine] Hives and Rash     Health Maintenance   Topic Date Due    Depression Screen  Never done    Varicella vaccine (1 of 2 - 13+ 2-dose series) Never done    Hepatitis C screen  Never done    Hepatitis B vaccine (1 of 3 - 19+ 3-dose series) Never done    Flu vaccine (1) 08/01/2024    COVID-19 Vaccine (1 - 2023-24 season) Never done    Cervical cancer screen  05/20/2027    DTaP/Tdap/Td vaccine (2 - Td or Tdap) 12/01/2030    HIV screen  Completed    Hepatitis A vaccine  Aged Out    Hib vaccine  Aged Out    HPV vaccine  Aged Out    Polio vaccine  Aged Out    Meningococcal (ACWY) vaccine  Aged Out    Pneumococcal 0-64 years Vaccine  Aged Out         Review of Systems    Constitutional: negative for chills and fevers  Eyes: negative for irritation, redness and visual disturbance  Respiratory: negative for cough, shortness of breath and wheezing  Cardiovascular: negative for chest pain, irregular heart beat and palpitations   The remainder of systems were reviewed and negative.       Objective:    /82 (Site: Left Upper Arm, Position: Sitting, Cuff Size: Large

## 2024-12-19 ENCOUNTER — HOSPITAL ENCOUNTER (OUTPATIENT)
Age: 39
Discharge: HOME | End: 2024-12-19
Payer: COMMERCIAL

## 2024-12-19 DIAGNOSIS — E89.0 POSTOPERATIVE HYPOTHYROIDISM: ICD-10-CM

## 2024-12-19 LAB
T4 FREE SERPL-MCNC: 1.11 NG/DL (ref 0.93–1.68)
TSH SERPL DL<=0.005 MIU/L-ACNC: 6.78 UIU/ML (ref 0.4–4.2)

## 2024-12-19 PROCEDURE — 36415 COLL VENOUS BLD VENIPUNCTURE: CPT

## 2024-12-19 PROCEDURE — 84439 ASSAY OF FREE THYROXINE: CPT

## 2024-12-19 PROCEDURE — 84443 ASSAY THYROID STIM HORMONE: CPT

## 2024-12-27 ENCOUNTER — TELEPHONE (OUTPATIENT)
Age: 39
End: 2024-12-27

## 2024-12-27 NOTE — TELEPHONE ENCOUNTER
----- Message from Dr. Lionel Zimmer MD sent at 12/25/2024 10:48 PM EST -----  Abnormal thyroid labs.  Please get patient for me to discuss.

## 2025-01-03 ENCOUNTER — CLINICAL DOCUMENTATION (OUTPATIENT)
Age: 40
End: 2025-01-03

## 2025-01-03 DIAGNOSIS — E89.0 POSTOPERATIVE HYPOTHYROIDISM: Primary | ICD-10-CM

## 2025-01-03 NOTE — PROGRESS NOTES
Labs discussed with patient.  Change Synthroid to 1 tablet daily.  Get free T4 and TSH in a month.  Please coordinate.

## 2025-01-26 RX ORDER — LEVOTHYROXINE SODIUM 175 UG/1
TABLET ORAL
Qty: 30 TABLET | Refills: 5 | Status: CANCELLED | OUTPATIENT
Start: 2025-01-26

## 2025-01-26 NOTE — PROGRESS NOTES
[facility] [place] [facility]  [facility] ENDOCRINOLOGY  [address]  [facility] [address]  Dept: [phone]  Loc: [phone]     Visit Date: 1/27/2025  The patient (or guardian, if applicable) and other individuals in attendance with the patient were advised that Artificial Intelligence will be utilized during this visit to record, process the conversation to generate a clinical note, and support improvement of the AI technology. The patient (or guardian, if applicable) and other individuals in attendance at the appointment consented to the use of AI, including the recording.        Shandra Camilo is a 39 y.o. female who presents today for:  Chief Complaint   Patient presents with    Follow-up              Subjective:      HPI     Shandra Camilo is a 39 y.o. , female who comes for f/u  for hypothyroidism.  Referring provider:   Joshua Sy MD       This patient was diagnosed with hypothyroidism 3 years ago.   Etiology of hypothyroidism is Surgery.  The pathology was benign.  Current therapy includes 175mcg of synthroid daily.  The patient has not had thyroid levels repeated since she was placed on the current dose.  Current symptoms include: denies Cold intolerance, Depression, Weight Gain, Fatigue, Dry Skin, and Brittle Hair     Past Medical History:   Diagnosis Date    Abnormal Pap smear of cervix     cervical polyp    Arthritis     Chronic idiopathic urticaria     sees Dr Leyva in [place]    Obesity     Thyroid nodule 02/2022      Past Surgical History:   Procedure Laterality Date    KNEE ARTHROSCOPY Left     THYROIDECTOMY N/A 5/10/2022    TOTAL THYROIDECTOMY performed by Julio Cesar Najera MD at [facility] OR    US ASP/INJ THYROID CYST  02/10/2022    US THYROID CYST ASPIRATION AND OR INJECTION 2/10/2022 Venancio Do MD [facility] ULTRASOUND    WISDOM TOOTH EXTRACTION         Family History   Problem Relation Age of Onset    Other Mother         skin cancer    Cancer Mother

## 2025-01-27 ENCOUNTER — OFFICE VISIT (OUTPATIENT)
Age: 40
End: 2025-01-27
Payer: COMMERCIAL

## 2025-01-27 VITALS
SYSTOLIC BLOOD PRESSURE: 126 MMHG | BODY MASS INDEX: 48.82 KG/M2 | HEART RATE: 99 BPM | DIASTOLIC BLOOD PRESSURE: 82 MMHG | RESPIRATION RATE: 18 BRPM | WEIGHT: 293 LBS | HEIGHT: 65 IN

## 2025-01-27 DIAGNOSIS — E89.0 POSTOPERATIVE HYPOTHYROIDISM: Primary | ICD-10-CM

## 2025-01-27 PROCEDURE — G8417 CALC BMI ABV UP PARAM F/U: HCPCS | Performed by: INTERNAL MEDICINE

## 2025-01-27 PROCEDURE — 99213 OFFICE O/P EST LOW 20 MIN: CPT | Performed by: INTERNAL MEDICINE

## 2025-01-27 PROCEDURE — 1036F TOBACCO NON-USER: CPT | Performed by: INTERNAL MEDICINE

## 2025-01-27 PROCEDURE — G8427 DOCREV CUR MEDS BY ELIG CLIN: HCPCS | Performed by: INTERNAL MEDICINE

## 2025-03-13 ENCOUNTER — HOSPITAL ENCOUNTER (OUTPATIENT)
Age: 40
Discharge: HOME | End: 2025-03-13
Payer: COMMERCIAL

## 2025-03-13 DIAGNOSIS — E89.0 POSTOPERATIVE HYPOTHYROIDISM: ICD-10-CM

## 2025-03-13 LAB
T4 FREE SERPL-MCNC: 1.2 NG/DL (ref 0.92–1.68)
TSH SERPL DL<=0.05 MIU/L-ACNC: 5.45 UIU/ML (ref 0.27–4.2)

## 2025-03-13 PROCEDURE — 84443 ASSAY THYROID STIM HORMONE: CPT

## 2025-03-13 PROCEDURE — 36415 COLL VENOUS BLD VENIPUNCTURE: CPT

## 2025-03-13 PROCEDURE — 84439 ASSAY OF FREE THYROXINE: CPT

## 2025-03-15 ENCOUNTER — CLINICAL DOCUMENTATION (OUTPATIENT)
Age: 40
End: 2025-03-15

## 2025-03-15 ENCOUNTER — RESULTS FOLLOW-UP (OUTPATIENT)
Age: 40
End: 2025-03-15

## 2025-03-15 DIAGNOSIS — E89.0 POSTOPERATIVE HYPOTHYROIDISM: Primary | ICD-10-CM

## 2025-03-15 NOTE — PROGRESS NOTES
Change Synthroid 175 mcg to 1.5 tablets on Sundays and 1 tablet daily the rest of the week.  Get free T4 and TSH in a month.  Keep existing appointment.

## 2025-03-15 NOTE — RESULT ENCOUNTER NOTE
Shandra Camilo  lab test(s) were abnormal.  Change Synthroid 175 mcg to 1.5 tablets on Sundays and 1 tablet daily the rest of the week.  Get free T4 and TSH in a month.  Keep existing appointment.  Please contact patient and document response.

## 2025-03-18 NOTE — TELEPHONE ENCOUNTER
----- Message from Dr. Lionel Zimmer MD sent at 3/15/2025  3:20 PM EDT -----  Shandra Camilo  lab test(s) were abnormal.  Change Synthroid 175 mcg to 1.5 tablets on Sundays and 1 tablet daily the rest of the week.  Get free T4 and TSH in a month.  Keep existing appointment.  Please contact patient and document response.

## 2025-03-18 NOTE — TELEPHONE ENCOUNTER
Left VM    ----- Message from Dr. Lionel Zimmer MD sent at 3/15/2025  3:20 PM EDT -----  Shandra Camilo  lab test(s) were abnormal.  Change Synthroid 175 mcg to 1.5 tablets on Sundays and 1 tablet daily the rest of the week.  Get free T4 and TSH in a month.  Keep existing appointment.  Please contact patient and document response.

## 2025-04-24 ENCOUNTER — HOSPITAL ENCOUNTER (OUTPATIENT)
Age: 40
Discharge: HOME | End: 2025-04-24
Payer: COMMERCIAL

## 2025-04-24 DIAGNOSIS — E89.0 POSTOPERATIVE HYPOTHYROIDISM: ICD-10-CM

## 2025-04-24 LAB
T4 FREE SERPL-MCNC: 1.6 NG/DL (ref 0.92–1.68)
TSH SERPL DL<=0.05 MIU/L-ACNC: 1.31 UIU/ML (ref 0.27–4.2)

## 2025-04-24 PROCEDURE — 84443 ASSAY THYROID STIM HORMONE: CPT

## 2025-04-24 PROCEDURE — 84439 ASSAY OF FREE THYROXINE: CPT

## 2025-04-24 PROCEDURE — 36415 COLL VENOUS BLD VENIPUNCTURE: CPT

## 2025-04-26 ENCOUNTER — RESULTS FOLLOW-UP (OUTPATIENT)
Age: 40
End: 2025-04-26

## 2025-04-28 ENCOUNTER — OFFICE VISIT (OUTPATIENT)
Age: 40
End: 2025-04-28
Payer: COMMERCIAL

## 2025-04-28 VITALS
SYSTOLIC BLOOD PRESSURE: 128 MMHG | DIASTOLIC BLOOD PRESSURE: 74 MMHG | HEART RATE: 93 BPM | WEIGHT: 293 LBS | HEIGHT: 65 IN | BODY MASS INDEX: 48.82 KG/M2

## 2025-04-28 DIAGNOSIS — E89.0 POSTOPERATIVE HYPOTHYROIDISM: Primary | ICD-10-CM

## 2025-04-28 PROCEDURE — G8427 DOCREV CUR MEDS BY ELIG CLIN: HCPCS | Performed by: INTERNAL MEDICINE

## 2025-04-28 PROCEDURE — 99213 OFFICE O/P EST LOW 20 MIN: CPT | Performed by: INTERNAL MEDICINE

## 2025-04-28 PROCEDURE — 1036F TOBACCO NON-USER: CPT | Performed by: INTERNAL MEDICINE

## 2025-04-28 PROCEDURE — G8417 CALC BMI ABV UP PARAM F/U: HCPCS | Performed by: INTERNAL MEDICINE

## 2025-04-28 RX ORDER — LEVOTHYROXINE SODIUM 175 UG/1
TABLET ORAL
Qty: 30 TABLET | Refills: 5 | Status: SHIPPED | OUTPATIENT
Start: 2025-04-28

## 2025-04-28 RX ORDER — PREDNISONE 5 MG/1
TABLET ORAL
COMMUNITY
Start: 2025-02-21

## 2025-04-28 NOTE — PROGRESS NOTES
[facility] [place] [facility]  [facility] ENDOCRINOLOGY  [address]  [address]  [facility] [address]  Dept: [phone]  Loc: [phone]     Visit Date: 4/28/2025    Shandra Camilo is a 39 y.o. female who presents today for:  Chief Complaint   Patient presents with    Follow-up     Postoperative hypothyroidism               Subjective:      HPI     Shandra Camilo is a 39 y.o. , female who comes for f/u  for hypothyroidism.  Referring provider:   Joshua Sy MD       This patient was diagnosed with hypothyroidism 3 years ago.   Etiology of hypothyroidism is Surgery.  The pathology was benign.  Current therapy includes Synthroid 175 mcg to 1.5 tablets on Sundays and 1 tablet daily the rest of the week.  She had normal thyroid levels on 4/24/2025.  Current symptoms include Cold intolerance and Fatigue     Past Medical History:   Diagnosis Date    Abnormal Pap smear of cervix     cervical polyp    Arthritis     Chronic idiopathic urticaria     sees Dr Leyva in [place]    Obesity     Thyroid nodule 02/2022      Past Surgical History:   Procedure Laterality Date    KNEE ARTHROSCOPY Left     THYROIDECTOMY N/A 5/10/2022    TOTAL THYROIDECTOMY performed by Julio Cesar Najera MD at [facility] OR    US ASP/INJ THYROID CYST  02/10/2022    US THYROID CYST ASPIRATION AND OR INJECTION 2/10/2022 Venancio Do MD [facility] ULTRASOUND    WISDOM TOOTH EXTRACTION         Family History   Problem Relation Age of Onset    Other Mother         skin cancer    Cancer Mother         thyroid    Diabetes Mother     High Blood Pressure Mother     Cancer Father         prostate    Stroke Father     Diabetes Brother     Cancer Maternal Grandmother         possible thyroid maybe nodules     Social History     Tobacco Use    Smoking status: Never     Passive exposure: Never    Smokeless tobacco: Never   Substance Use Topics    Alcohol use: Not Currently     Comment: social      Current Outpatient Medications   Medication

## 2025-07-16 ENCOUNTER — LAB (OUTPATIENT)
Dept: LAB | Age: 40
End: 2025-07-16

## 2025-07-26 ENCOUNTER — HOSPITAL ENCOUNTER (OUTPATIENT)
Age: 40
Discharge: HOME | End: 2025-07-26
Payer: COMMERCIAL

## 2025-07-26 DIAGNOSIS — E89.0 POSTOPERATIVE HYPOTHYROIDISM: ICD-10-CM

## 2025-07-26 LAB
T4 FREE SERPL-MCNC: 1.6 NG/DL (ref 0.92–1.68)
TSH SERPL DL<=0.05 MIU/L-ACNC: 0.72 UIU/ML (ref 0.27–4.2)

## 2025-07-26 PROCEDURE — 84439 ASSAY OF FREE THYROXINE: CPT

## 2025-07-26 PROCEDURE — 84443 ASSAY THYROID STIM HORMONE: CPT

## 2025-07-26 PROCEDURE — 36415 COLL VENOUS BLD VENIPUNCTURE: CPT

## 2025-07-31 ENCOUNTER — OFFICE VISIT (OUTPATIENT)
Age: 40
End: 2025-07-31
Payer: COMMERCIAL

## 2025-07-31 VITALS
BODY MASS INDEX: 48.82 KG/M2 | WEIGHT: 293 LBS | SYSTOLIC BLOOD PRESSURE: 116 MMHG | HEART RATE: 87 BPM | HEIGHT: 65 IN | DIASTOLIC BLOOD PRESSURE: 84 MMHG

## 2025-07-31 DIAGNOSIS — E89.0 POSTOPERATIVE HYPOTHYROIDISM: Primary | ICD-10-CM

## 2025-07-31 PROCEDURE — 99213 OFFICE O/P EST LOW 20 MIN: CPT | Performed by: INTERNAL MEDICINE

## 2025-07-31 PROCEDURE — 1036F TOBACCO NON-USER: CPT | Performed by: INTERNAL MEDICINE

## 2025-07-31 PROCEDURE — G8417 CALC BMI ABV UP PARAM F/U: HCPCS | Performed by: INTERNAL MEDICINE

## 2025-07-31 PROCEDURE — G8427 DOCREV CUR MEDS BY ELIG CLIN: HCPCS | Performed by: INTERNAL MEDICINE

## 2025-07-31 RX ORDER — LEVOTHYROXINE SODIUM 175 UG/1
TABLET ORAL
Qty: 30 TABLET | Refills: 5 | Status: SHIPPED | OUTPATIENT
Start: 2025-07-31

## 2025-07-31 NOTE — PROGRESS NOTES
[facility] [place] [facility]  [facility] ENDOCRINOLOGY  [address]  [address]  [facility] [address]  Dept: [phone]  Loc: [phone]     Visit Date: 7/31/2025    Shandra Camilo is a 40 y.o. female who presents today for:  Chief Complaint   Patient presents with    Follow-up     POSTOPERATIVE HYPOTHYROIDISM    The patient (or guardian, if applicable) and other individuals in attendance with the patient were advised that Artificial Intelligence will be utilized during this visit to record, process the conversation to generate a clinical note, and support improvement of the AI technology. The patient (or guardian, if applicable) and other individuals in attendance at the appointment consented to the use of AI, including the recording.     Subjective:      HPI   History of Present Illness  The patient is a 40-year-old female who presents for evaluation of hypothyroidism.    She underwent a thyroidectomy 3 years ago and has been managing her hypothyroidism with Synthroid since then. Her current regimen includes a daily dose of 175 mcg, with an increased dose of 1.5 tablets on Sundays. This dosage was adjusted during her last visit, approximately 3 months ago. She reports feeling well overall, with no significant weight changes. Her sleep pattern is normal, and she does not experience constipation or depression. She also reports no issues with hair breakage.     However, she has noticed some changes in the texture of her skin on her thighs, describing it as occasionally scaly, but without any associated itching. She applies lotion to this area regularly. Similar skin changes have been observed on her feet in the past, but not currently.    PAST SURGICAL HISTORY:  Thyroidectomy 3 years ago.                Past Medical History:   Diagnosis Date    Abnormal Pap smear of cervix     cervical polyp    Arthritis     Chronic idiopathic urticaria     sees Dr Leyva in [facility]     Thyroid

## 2025-08-07 LAB — CYTOLOGY THIN PREP PAP: NORMAL

## (undated) DEVICE — PENCIL SMK EVAC ALL IN 1 DSGN ENH VISIBILITY IMPROVED AIR

## (undated) DEVICE — DISSECTOR ENDO L13CM CVD JAW CRDLSS SONICISION

## (undated) DEVICE — SUTURE VCRL SZ 3-0 L18IN ABSRB VLT L26MM SH 1/2 CIR J774D

## (undated) DEVICE — BREAST HERNIA PACK: Brand: MEDLINE INDUSTRIES, INC.

## (undated) DEVICE — SUTURE VCRL + SZ 4-0 L27IN ABSRB WHT FS-2 3/8 CIR REV CUT VCP422H

## (undated) DEVICE — AGENT HEMSTAT W2XL4IN OXIDIZED REGENERATED CELOS ABSRB SFT

## (undated) DEVICE — GLOVE ORANGE PI 8   MSG9080

## (undated) DEVICE — GLOVE ORANGE PI 7   MSG9070